# Patient Record
Sex: MALE | Race: WHITE | Employment: OTHER | URBAN - METROPOLITAN AREA
[De-identification: names, ages, dates, MRNs, and addresses within clinical notes are randomized per-mention and may not be internally consistent; named-entity substitution may affect disease eponyms.]

---

## 2017-10-17 ENCOUNTER — HOSPITAL ENCOUNTER (OUTPATIENT)
Dept: INTERVENTIONAL RADIOLOGY/VASCULAR | Age: 62
Discharge: HOME | End: 2017-10-17
Payer: MEDICARE

## 2017-10-17 VITALS
TEMPERATURE: 97.1 F | HEART RATE: 61 BPM | HEIGHT: 66 IN | WEIGHT: 135 LBS | RESPIRATION RATE: 18 BRPM | BODY MASS INDEX: 21.69 KG/M2 | DIASTOLIC BLOOD PRESSURE: 92 MMHG | OXYGEN SATURATION: 100 % | SYSTOLIC BLOOD PRESSURE: 189 MMHG

## 2017-10-17 DIAGNOSIS — N18.6 ESRD (END STAGE RENAL DISEASE) (HCC): ICD-10-CM

## 2017-10-17 LAB
INR BLD: 0.9
PARTIAL THROMBOPLASTIN TIME: 24.2 SEC (ref 21.3–31.3)
PLATELET # BLD: 199 K/UL (ref 140–450)
PROTHROMBIN TIME: 9.7 SEC (ref 9.4–12.6)

## 2017-10-17 PROCEDURE — 85610 PROTHROMBIN TIME: CPT

## 2017-10-17 PROCEDURE — 36596 MECH REMOV TUNNELED CV CATH: CPT | Performed by: RADIOLOGY

## 2017-10-17 PROCEDURE — 6360000002 HC RX W HCPCS: Performed by: RADIOLOGY

## 2017-10-17 PROCEDURE — 85049 AUTOMATED PLATELET COUNT: CPT

## 2017-10-17 PROCEDURE — 7100000010 HC PHASE II RECOVERY - FIRST 15 MIN

## 2017-10-17 PROCEDURE — 36581 REPLACE TUNNELED CV CATH: CPT | Performed by: RADIOLOGY

## 2017-10-17 PROCEDURE — 2580000003 HC RX 258: Performed by: RADIOLOGY

## 2017-10-17 PROCEDURE — 6360000004 HC RX CONTRAST MEDICATION: Performed by: INTERNAL MEDICINE

## 2017-10-17 PROCEDURE — 85730 THROMBOPLASTIN TIME PARTIAL: CPT

## 2017-10-17 PROCEDURE — 7100000011 HC PHASE II RECOVERY - ADDTL 15 MIN

## 2017-10-17 PROCEDURE — C1769 GUIDE WIRE: HCPCS

## 2017-10-17 PROCEDURE — 6360000002 HC RX W HCPCS

## 2017-10-17 PROCEDURE — 77001 FLUOROGUIDE FOR VEIN DEVICE: CPT | Performed by: RADIOLOGY

## 2017-10-17 PROCEDURE — 75902 REMOVE CVA LUMEN OBSTRUCT: CPT | Performed by: RADIOLOGY

## 2017-10-17 RX ORDER — ACETAMINOPHEN 325 MG/1
650 TABLET ORAL EVERY 4 HOURS PRN
Status: DISCONTINUED | OUTPATIENT
Start: 2017-10-17 | End: 2017-10-20 | Stop reason: HOSPADM

## 2017-10-17 RX ORDER — MIDAZOLAM HYDROCHLORIDE 1 MG/ML
INJECTION INTRAMUSCULAR; INTRAVENOUS
Status: COMPLETED | OUTPATIENT
Start: 2017-10-17 | End: 2017-10-17

## 2017-10-17 RX ORDER — HEPARIN SODIUM 5000 [USP'U]/ML
INJECTION, SOLUTION INTRAVENOUS; SUBCUTANEOUS
Status: COMPLETED | OUTPATIENT
Start: 2017-10-17 | End: 2017-10-17

## 2017-10-17 RX ORDER — SODIUM CHLORIDE 9 MG/ML
INJECTION, SOLUTION INTRAVENOUS CONTINUOUS
Status: DISCONTINUED | OUTPATIENT
Start: 2017-10-17 | End: 2017-10-20 | Stop reason: HOSPADM

## 2017-10-17 RX ORDER — FENTANYL CITRATE 50 UG/ML
INJECTION, SOLUTION INTRAMUSCULAR; INTRAVENOUS
Status: COMPLETED | OUTPATIENT
Start: 2017-10-17 | End: 2017-10-17

## 2017-10-17 RX ORDER — FAMOTIDINE 20 MG/1
20 TABLET, FILM COATED ORAL ONCE
COMMUNITY

## 2017-10-17 RX ADMIN — SODIUM CHLORIDE 20 ML: 9 INJECTION, SOLUTION INTRAVENOUS at 10:30

## 2017-10-17 RX ADMIN — HEPARIN SODIUM 1900 UNITS: 5000 INJECTION, SOLUTION INTRAVENOUS; SUBCUTANEOUS at 12:30

## 2017-10-17 RX ADMIN — CEFAZOLIN SODIUM 1 G: 1 INJECTION, SOLUTION INTRAVENOUS at 11:05

## 2017-10-17 RX ADMIN — IOVERSOL 5 ML: 741 INJECTION INTRA-ARTERIAL; INTRAVENOUS at 12:39

## 2017-10-17 RX ADMIN — FENTANYL CITRATE 50 MCG: 50 INJECTION INTRAMUSCULAR; INTRAVENOUS at 12:27

## 2017-10-17 RX ADMIN — MIDAZOLAM HYDROCHLORIDE 1 MG: 1 INJECTION, SOLUTION INTRAMUSCULAR; INTRAVENOUS at 12:27

## 2017-10-17 ASSESSMENT — PAIN - FUNCTIONAL ASSESSMENT: PAIN_FUNCTIONAL_ASSESSMENT: 0-10

## 2017-10-17 ASSESSMENT — PAIN SCALES - GENERAL: PAINLEVEL_OUTOF10: 0

## 2017-10-17 NOTE — H&P
History and Physical    Pt Name: Maliha Lu  MRN: [id number]  YOB: 1955  Date of evaluation: 10/17/2017  Primary Care Physician: Shira Garcia DO  Patient evaluated at the request of  Dr. Delena Leventhal    Reason for evaluation ESRD  SUBJECTIVE:   History of Chief Complaint:      Fadia Pelaez is a 65 y.o. male  Who has been on hemodialysis x 2 months , hx of left kidney cancer  Resulting in left nephrectomy . Is being worked up for / home Peritoneal dialysis . Past Medical History      has a past medical history of Accelerated rejection of lung transplant ([facility] [address] Acute blood loss anemia; Acute postoperative respiratory insufficiency; Bronchiolitis obliterans syndrome ([facility] [address] Cancer [facility]); COPD (chronic obstructive pulmonary disease) ([facility] [address] Diverticulitis; Hypoalbuminemia; Hypomagnesemia; Hypophosphatemia; Immunosuppression ([facility] [address] Intra-abdominal abscess ([facility] [address] Osteoporosis; Peritonitis ([facility] [address] and Vitamin D deficiency. Past Surgical History   has a past surgical history that includes Lung transplant, double; Leg Surgery; and Small intestine surgery. Medications   Scheduled Meds:   ceFAZolin  1 g Intravenous On Call to OR     Current Outpatient Rx   Medication Sig Dispense Refill    famotidine (PEPCID) 20 MG tablet Take 20 mg by mouth once      tacrolimus (PROGRAF) 1 MG capsule Take 1.5 mg by mouth 2 times daily.  aspirin 81 MG tablet Take 81 mg by mouth daily.  folic acid (FOLVITE) 1 MG tablet Take 1 mg by mouth daily.  sulfamethoxazole-trimethoprim (BACTRIM;SEPTRA) 400-80 MG per tablet Take 1 tablet by mouth 2 times daily. Once Wed and Friday      predniSONE (DELTASONE) 5 MG tablet Take 5 mg by mouth.  azithromycin (ZITHROMAX) 250 MG tablet Take 250 mg by mouth daily.  Cholecalciferol (VITAMIN D3) 65185 UNITS CAPS Take  by mouth.  Multiple Vitamins-Minerals (THERAPEUTIC MULTIVITAMIN-MINERALS) tablet Take 1 tablet by mouth.       montelukast (SINGULAIR) 10 MG tablet Take 10 mg by mouth nightly.  calcium carbonate (OSCAL) 500 MG TABS tablet Take 500 mg by mouth 2 times daily.  magnesium oxide (MAG-OX) 400 MG tablet Take 400 mg by mouth 2 times daily.  omeprazole (PRILOSEC) 20 MG capsule Take 20 mg by mouth daily. Continuous Infusions:   sodium chloride       PRN Meds:. Allergies  is allergic to albuterol and hydrocodone. Family History    family history is not on file. No family status information on file. Social History  Social History     Social History    Marital status:      Spouse name: N/A    Number of children: N/A    Years of education: N/A     Occupational History    Not on file. Social History Main Topics    Smoking status: Former Smoker     Packs/day: 1.50     Years: 30.00     Types: Cigarettes     Quit date: 2010    Smokeless tobacco: Not on file      Comment: quit  2010    Alcohol use No    Drug use: No    Sexual activity: Not on file     Other Topics Concern    Not on file     Social History Narrative    No narrative on file        Service:No         Hobbies:  Is a grandfather ,     OBJECTIVE:   VITALS:  height is 5' 6\" (1.676 m) and weight is 135 lb (61.2 kg). His infrared temperature is 97.1 °F (36.2 °C). His blood pressure is 211/95 (abnormal) and his pulse is 61. His respiration is 18 and oxygen saturation is 100%. CONSTITUTIONAL: Alert and oriented times 3, no acute distress and cooperative to examination. friendly and pleasant     SKIN: rash No    HEENT: Head is normocephalic, atraumatic. EOMI, PERRLA    Oral air way :slightly narrow Yes    NECK: neck supple, no lymphadenopathy noted, trachea midline and straight       2+ carotid, no bruit    LUNGS: Chest expands equally bilaterally upon respiration, no accessory muscle used. Ausculation reveals no adventitious breath sounds.   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

## 2018-10-15 ENCOUNTER — APPOINTMENT (OUTPATIENT)
Dept: GENERAL RADIOLOGY | Age: 63
End: 2018-10-15
Payer: MEDICARE

## 2018-10-15 ENCOUNTER — APPOINTMENT (OUTPATIENT)
Dept: CT IMAGING | Age: 63
End: 2018-10-15
Payer: MEDICARE

## 2018-10-15 ENCOUNTER — HOSPITAL ENCOUNTER (EMERGENCY)
Age: 63
Discharge: HOME | End: 2018-10-16
Attending: EMERGENCY MEDICINE
Payer: MEDICARE

## 2018-10-15 VITALS
OXYGEN SATURATION: 100 % | BODY MASS INDEX: 21.74 KG/M2 | WEIGHT: 130.5 LBS | HEART RATE: 94 BPM | DIASTOLIC BLOOD PRESSURE: 102 MMHG | TEMPERATURE: 97.7 F | RESPIRATION RATE: 14 BRPM | SYSTOLIC BLOOD PRESSURE: 196 MMHG | HEIGHT: 65 IN

## 2018-10-15 DIAGNOSIS — S42.292A OTHER CLOSED DISPLACED FRACTURE OF PROXIMAL END OF LEFT HUMERUS, INITIAL ENCOUNTER: Primary | ICD-10-CM

## 2018-10-15 DIAGNOSIS — S52.502A CLOSED FRACTURE OF DISTAL END OF LEFT RADIUS, UNSPECIFIED FRACTURE MORPHOLOGY, INITIAL ENCOUNTER: ICD-10-CM

## 2018-10-15 PROCEDURE — 96376 TX/PRO/DX INJ SAME DRUG ADON: CPT

## 2018-10-15 PROCEDURE — 96374 THER/PROPH/DIAG INJ IV PUSH: CPT

## 2018-10-15 PROCEDURE — 73080 X-RAY EXAM OF ELBOW: CPT

## 2018-10-15 PROCEDURE — 72128 CT CHEST SPINE W/O DYE: CPT

## 2018-10-15 PROCEDURE — 82306 VITAMIN D 25 HYDROXY: CPT

## 2018-10-15 PROCEDURE — 6360000002 HC RX W HCPCS: Performed by: EMERGENCY MEDICINE

## 2018-10-15 PROCEDURE — 73200 CT UPPER EXTREMITY W/O DYE: CPT

## 2018-10-15 PROCEDURE — 99284 EMERGENCY DEPT VISIT MOD MDM: CPT

## 2018-10-15 PROCEDURE — 72131 CT LUMBAR SPINE W/O DYE: CPT

## 2018-10-15 PROCEDURE — 72125 CT NECK SPINE W/O DYE: CPT

## 2018-10-15 PROCEDURE — 73060 X-RAY EXAM OF HUMERUS: CPT

## 2018-10-15 PROCEDURE — 73030 X-RAY EXAM OF SHOULDER: CPT

## 2018-10-15 PROCEDURE — 73110 X-RAY EXAM OF WRIST: CPT

## 2018-10-15 PROCEDURE — 70450 CT HEAD/BRAIN W/O DYE: CPT

## 2018-10-15 PROCEDURE — 73130 X-RAY EXAM OF HAND: CPT

## 2018-10-15 PROCEDURE — 29105 APPLICATION LONG ARM SPLINT: CPT

## 2018-10-15 RX ORDER — MORPHINE SULFATE 4 MG/ML
4 INJECTION, SOLUTION INTRAMUSCULAR; INTRAVENOUS ONCE
Status: COMPLETED | OUTPATIENT
Start: 2018-10-15 | End: 2018-10-15

## 2018-10-15 RX ADMIN — MORPHINE SULFATE 4 MG: 4 INJECTION INTRAVENOUS at 20:16

## 2018-10-15 RX ADMIN — MORPHINE SULFATE 4 MG: 4 INJECTION INTRAVENOUS at 22:03

## 2018-10-15 ASSESSMENT — PAIN DESCRIPTION - ORIENTATION: ORIENTATION: LEFT

## 2018-10-15 ASSESSMENT — PAIN DESCRIPTION - DESCRIPTORS: DESCRIPTORS: SPASM

## 2018-10-15 ASSESSMENT — PAIN SCALES - GENERAL
PAINLEVEL_OUTOF10: 9
PAINLEVEL_OUTOF10: 9

## 2018-10-15 ASSESSMENT — PAIN DESCRIPTION - PAIN TYPE: TYPE: ACUTE PAIN

## 2018-10-15 NOTE — ED NOTES
Pt to ED c/o left shoulder pain, left wrist pain and lower back pain. Pt states he fell from about 3 feet off a ladder. Pt states he was inside his garage putting insulation in the roof. Pt states he landed on the concrete floor on his left side. Pt arrives wearing a mask, as he had a double lung transplant in 2011 and likes to be careful. Pt states he does peritoneal dialysis at his home. Pt is alert and orientedx3, rr even and unlabored, will continue to monitor.       Karlos Dudley RN  10/15/18 0668

## 2018-10-15 NOTE — ED NOTES
Pt back from MRI. Pt given boxed lunch. No needs at this time. Will continue to monitor.      Roshni Bales RN  10/15/18 4897

## 2018-10-16 ENCOUNTER — APPOINTMENT (OUTPATIENT)
Dept: GENERAL RADIOLOGY | Age: 63
End: 2018-10-16
Payer: MEDICARE

## 2018-10-16 PROCEDURE — 6370000000 HC RX 637 (ALT 250 FOR IP): Performed by: EMERGENCY MEDICINE

## 2018-10-16 PROCEDURE — 73110 X-RAY EXAM OF WRIST: CPT

## 2018-10-16 RX ORDER — HYDROCODONE BITARTRATE AND ACETAMINOPHEN 5; 325 MG/1; MG/1
1 TABLET ORAL EVERY 6 HOURS PRN
Qty: 28 TABLET | Refills: 0 | Status: SHIPPED | OUTPATIENT
Start: 2018-10-16 | End: 2018-10-23

## 2018-10-16 RX ORDER — HYDROCODONE BITARTRATE AND ACETAMINOPHEN 5; 325 MG/1; MG/1
1 TABLET ORAL ONCE
Status: COMPLETED | OUTPATIENT
Start: 2018-10-16 | End: 2018-10-16

## 2018-10-16 RX ORDER — CYCLOBENZAPRINE HCL 10 MG
10 TABLET ORAL 3 TIMES DAILY PRN
Qty: 21 TABLET | Refills: 0 | Status: SHIPPED | OUTPATIENT
Start: 2018-10-16 | End: 2018-10-23

## 2018-10-16 RX ADMIN — HYDROCODONE BITARTRATE AND ACETAMINOPHEN 1 TABLET: 5; 325 TABLET ORAL at 01:08

## 2018-10-16 ASSESSMENT — ENCOUNTER SYMPTOMS
COUGH: 0
ABDOMINAL PAIN: 0
BACK PAIN: 1
EYE PAIN: 0
RHINORRHEA: 0
NAUSEA: 0
SHORTNESS OF BREATH: 0
COLOR CHANGE: 0
SORE THROAT: 0
VOMITING: 0

## 2018-10-16 ASSESSMENT — PAIN SCALES - GENERAL: PAINLEVEL_OUTOF10: 8

## 2018-10-16 NOTE — ED PROVIDER NOTES
[facility]  ED  Emergency Department Encounter  EmergencyMedicine Resident     Pt Name:Macon Beverley Felty  MRN: [id number]  [place] 1955  Date of evaluation: 10/16/18  PCP:  Vanessa Trejo, [address] [place] [address]       Chief Complaint   Patient presents with    Back Pain    Shoulder Injury     left shoulder landed on     Wrist Pain     left sided        HISTORY OF PRESENT ILLNESS  (Location/Symptom, Timing/Onset, Context/Setting, Quality, Duration, Modifying Factors, Severity.)      Marino Tobar is a 59 y.o. male who presents with Chief complaint of left shoulder pain, left wrist pain and lower back pain. States that about 1:00 this afternoon, he fell off a ladder from about 1 or 2 feet up from the ground. As he was getting off the ladder, he took a misstep and fell. States he landed on his left shoulder. States that he may have hit his left head, but denies any loss of consciousness. Does not take any blood thinners or anticoagulants. Denies any weakness, numbness or tingling to his upper or lower extremity spray only complaining of pain to the left shoulder and left wrist.  Does do peritoneal dialysis nightly at home. Denies any fever, chills, nausea, vomiting, headache or acute changes in vision. Denies any chest pain or belly pain. PAST MEDICAL / SURGICAL / SOCIAL / FAMILY HISTORY      has a past medical history of Accelerated rejection of lung transplant ([address] Acute blood loss anemia; Acute postoperative respiratory insufficiency; Bronchiolitis obliterans syndrome ([address] Cancer [facility]); COPD (chronic obstructive pulmonary disease) ([address] Diverticulitis; Hypoalbuminemia; Hypomagnesemia; Hypophosphatemia; Immunosuppression ([address] Intra-abdominal abscess ([address] Osteoporosis; Peritonitis ([address] and Vitamin D deficiency. has a past surgical history that includes Lung transplant, double; Leg Surgery; and Small intestine surgery.     Social History     Social History    Marital status:      Spouse name: N/A    Number of children: N/A    Years of education: N/A     Occupational History    Not on file. Social History Main Topics    Smoking status: Former Smoker     Packs/day: 1.50     Years: 30.00     Types: Cigarettes     Quit date: 2010    Smokeless tobacco: Never Used      Comment: quit  2010    Alcohol use No    Drug use: No    Sexual activity: Not on file     Other Topics Concern    Not on file     Social History Narrative    No narrative on file       History reviewed. No pertinent family history. Allergies:  Albuterol and Hydrocodone    Home Medications:  Prior to Admission medications    Medication Sig Start Date End Date Taking? Authorizing Provider   HYDROcodone-acetaminophen (NORCO) 5-325 MG per tablet Take 1 tablet by mouth every 6 hours as needed for Pain for up to 7 days. Intended supply: 3 days. Take lowest dose possible to manage pain. 10/16/18 10/23/18 Yes Teresa Albarran MD   cyclobenzaprine (FLEXERIL) 10 MG tablet Take 1 tablet by mouth 3 times daily as needed for Muscle spasms 10/16/18 10/23/18 Yes Teresa Albarran MD   tacrolimus (PROGRAF) 1 MG capsule Take 1.5 mg by mouth 2 times daily. Yes Historical Provider, MD   aspirin 81 MG tablet Take 81 mg by mouth daily. Yes Historical Provider, MD   folic acid (FOLVITE) 1 MG tablet Take 1 mg by mouth daily. Yes Historical Provider, MD   sulfamethoxazole-trimethoprim (BACTRIM;SEPTRA) 400-80 MG per tablet Take 1 tablet by mouth 2 times daily. Once Wed and Friday   Yes Historical Provider, MD   predniSONE (DELTASONE) 5 MG tablet Take 5 mg by mouth. Yes Historical Provider, MD   azithromycin (ZITHROMAX) 250 MG tablet Take 250 mg by mouth daily. Yes Historical Provider, MD   famotidine (PEPCID) 20 MG tablet Take 20 mg by mouth once    Historical Provider, MD   magnesium oxide (MAG-OX) 400 MG tablet Take 400 mg by mouth 2 times daily.     Historical Provider, MD   omeprazole (PRILOSEC) 20 MG capsule Views)    Result Date: 10/16/2018  EXAMINATION: 3 XRAY VIEWS OF THE LEFT WRIST 10/16/2018 12:39 am COMPARISON: Prior study at 9:05 p.m. HISTORY: ORDERING SYSTEM PROVIDED HISTORY: post-splint TECHNOLOGIST PROVIDED HISTORY: post-splint FINDINGS: Overlying cast material obscures skeletal detail. Previously described fracture through the distal radius shows near anatomic alignment. Evaluation of the triquetrum is limited due to cast material.     Near anatomic alignment of distal radius fracture. Xr Wrist Left (min 3 Views)    Result Date: 10/15/2018  EXAMINATION: FOUR XRAY VIEWS OF THE LEFT WRIST 10/15/2018 8:56 pm COMPARISON: None HISTORY: ORDERING SYSTEM PROVIDED HISTORY: fall TECHNOLOGIST PROVIDED HISTORY: fall FINDINGS: There is an acute closed traumatic nondisplaced fracture of the distal metaphysis of the left radius. Radial inclination and tilt are maintained. No associated ulnar fracture is identified. Small ossified fragment dorsal to the wrist may represent triquetrum fracture. Acute distal left radius fracture. Probable acute left triquetrum fracture. Xr Hand Left (min 3 Views)    Result Date: 10/15/2018  EXAMINATION: 3 XRAY VIEWS OF THE LEFT HAND 10/15/2018 8:56 pm COMPARISON: None HISTORY: ORDERING SYSTEM PROVIDED HISTORY: fall TECHNOLOGIST PROVIDED HISTORY: fall FINDINGS: No acute fracture or dislocation in the left hand. 3 mm radiopaque foreign body within the soft tissue of the 3rd digit along the palmar aspect of the distal phalanx. No other significant soft tissue findings. Radiopaque foreign body within the soft tissues of the distal 3rd digit. No underlying skeletal abnormality.      Ct Head Wo Contrast    Result Date: 10/15/2018  EXAMINATION: CT OF THE HEAD WITHOUT CONTRAST  10/15/2018 9:06 pm TECHNIQUE: CT of the head was performed without the administration of intravenous contrast. Dose modulation, iterative reconstruction, and/or weight based adjustment of the mA/kV was

## 2018-10-23 ENCOUNTER — OFFICE VISIT (OUTPATIENT)
Dept: ORTHOPEDIC SURGERY | Age: 63
End: 2018-10-23
Payer: MEDICARE

## 2018-10-23 VITALS — WEIGHT: 130.07 LBS | BODY MASS INDEX: 21.67 KG/M2 | HEIGHT: 65 IN

## 2018-10-23 DIAGNOSIS — S52.502A CLOSED FRACTURE OF DISTAL END OF LEFT RADIUS, UNSPECIFIED FRACTURE MORPHOLOGY, INITIAL ENCOUNTER: Primary | ICD-10-CM

## 2018-10-23 DIAGNOSIS — S42.295D OTHER CLOSED NONDISPLACED FRACTURE OF PROXIMAL END OF LEFT HUMERUS WITH ROUTINE HEALING, SUBSEQUENT ENCOUNTER: ICD-10-CM

## 2018-10-23 PROCEDURE — 23600 CLTX PROX HUMRL FX W/O MNPJ: CPT | Performed by: ORTHOPAEDIC SURGERY

## 2018-10-23 PROCEDURE — 25600 CLTX DST RDL FX/EPHYS SEP WO: CPT | Performed by: ORTHOPAEDIC SURGERY

## 2018-10-23 PROCEDURE — 99024 POSTOP FOLLOW-UP VISIT: CPT | Performed by: ORTHOPAEDIC SURGERY
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
## 2018-11-19 DIAGNOSIS — S42.295D OTHER CLOSED NONDISPLACED FRACTURE OF PROXIMAL END OF LEFT HUMERUS WITH ROUTINE HEALING, SUBSEQUENT ENCOUNTER: Primary | ICD-10-CM

## 2018-11-20 ENCOUNTER — OFFICE VISIT (OUTPATIENT)
Dept: ORTHOPEDIC SURGERY | Age: 63
End: 2018-11-20

## 2018-11-20 VITALS — WEIGHT: 130.07 LBS | HEIGHT: 65 IN | BODY MASS INDEX: 21.67 KG/M2

## 2018-11-20 DIAGNOSIS — S42.295D OTHER CLOSED NONDISPLACED FRACTURE OF PROXIMAL END OF LEFT HUMERUS WITH ROUTINE HEALING, SUBSEQUENT ENCOUNTER: Primary | ICD-10-CM

## 2018-11-20 DIAGNOSIS — S52.552D OTHER CLOSED EXTRA-ARTICULAR FRACTURE OF DISTAL END OF LEFT RADIUS WITH ROUTINE HEALING, SUBSEQUENT ENCOUNTER: ICD-10-CM

## 2018-11-20 PROCEDURE — 99024 POSTOP FOLLOW-UP VISIT: CPT | Performed by: ORTHOPAEDIC SURGERY

## 2018-12-20 ENCOUNTER — OFFICE VISIT (OUTPATIENT)
Dept: ORTHOPEDIC SURGERY | Age: 63
End: 2018-12-20

## 2018-12-20 VITALS — HEIGHT: 65 IN | WEIGHT: 130.07 LBS | BODY MASS INDEX: 21.67 KG/M2

## 2018-12-20 DIAGNOSIS — S42.295D OTHER CLOSED NONDISPLACED FRACTURE OF PROXIMAL END OF LEFT HUMERUS WITH ROUTINE HEALING, SUBSEQUENT ENCOUNTER: Primary | ICD-10-CM

## 2018-12-20 DIAGNOSIS — S52.552A OTHER CLOSED EXTRA-ARTICULAR FRACTURE OF DISTAL END OF LEFT RADIUS, INITIAL ENCOUNTER: ICD-10-CM

## 2018-12-20 PROBLEM — S52.502A CLOSED FRACTURE OF LEFT DISTAL RADIUS: Status: ACTIVE | Noted: 2018-12-20

## 2018-12-20 PROCEDURE — 99024 POSTOP FOLLOW-UP VISIT: CPT | Performed by: ORTHOPAEDIC SURGERY

## 2019-01-01 ENCOUNTER — HOSPITAL ENCOUNTER (INPATIENT)
Age: 64
LOS: 4 days | Discharge: ANOTHER HOSPITAL | DRG: 871 | End: 2019-09-27
Attending: EMERGENCY MEDICINE | Admitting: INTERNAL MEDICINE
Payer: MEDICARE

## 2019-01-01 ENCOUNTER — APPOINTMENT (OUTPATIENT)
Dept: GENERAL RADIOLOGY | Age: 64
DRG: 871 | End: 2019-01-01
Payer: MEDICARE

## 2019-01-01 ENCOUNTER — APPOINTMENT (OUTPATIENT)
Dept: CT IMAGING | Age: 64
DRG: 871 | End: 2019-01-01
Payer: MEDICARE

## 2019-01-01 VITALS
DIASTOLIC BLOOD PRESSURE: 78 MMHG | OXYGEN SATURATION: 99 % | BODY MASS INDEX: 18.99 KG/M2 | RESPIRATION RATE: 20 BRPM | HEIGHT: 66 IN | TEMPERATURE: 93.6 F | SYSTOLIC BLOOD PRESSURE: 108 MMHG | HEART RATE: 101 BPM | WEIGHT: 118.17 LBS

## 2019-01-01 DIAGNOSIS — R65.21 SEPTIC SHOCK (HCC): ICD-10-CM

## 2019-01-01 DIAGNOSIS — A41.9 SEPTIC SHOCK (HCC): ICD-10-CM

## 2019-01-01 DIAGNOSIS — J18.9 PNEUMONIA DUE TO ORGANISM: Primary | ICD-10-CM

## 2019-01-01 LAB
ABSOLUTE EOS #: 0 K/UL (ref 0–0.4)
ABSOLUTE EOS #: 0.09 K/UL (ref 0–0.4)
ABSOLUTE IMMATURE GRANULOCYTE: 0 K/UL (ref 0–0.3)
ABSOLUTE IMMATURE GRANULOCYTE: 0 K/UL (ref 0–0.3)
ABSOLUTE IMMATURE GRANULOCYTE: 0.1 K/UL (ref 0–0.3)
ABSOLUTE IMMATURE GRANULOCYTE: 0.14 K/UL (ref 0–0.3)
ABSOLUTE LYMPH #: 0.14 K/UL (ref 1–4.8)
ABSOLUTE LYMPH #: 0.29 K/UL (ref 1–4.8)
ABSOLUTE LYMPH #: 0.84 K/UL (ref 1–4.8)
ABSOLUTE LYMPH #: 1.27 K/UL (ref 1–4.8)
ABSOLUTE MONO #: 0.05 K/UL (ref 0.1–0.8)
ABSOLUTE MONO #: 0.07 K/UL (ref 0.1–0.8)
ABSOLUTE MONO #: 0.14 K/UL (ref 0.1–0.8)
ABSOLUTE MONO #: 0.27 K/UL (ref 0.1–0.8)
ALBUMIN SERPL-MCNC: 2 G/DL (ref 3.5–5.2)
ALBUMIN/GLOBULIN RATIO: 0.8 (ref 1–2.5)
ALLEN TEST: ABNORMAL
ALP BLD-CCNC: 74 U/L (ref 40–129)
ALT SERPL-CCNC: 22 U/L (ref 5–41)
ANION GAP SERPL CALCULATED.3IONS-SCNC: 18 MMOL/L (ref 9–17)
ANION GAP SERPL CALCULATED.3IONS-SCNC: 20 MMOL/L (ref 9–17)
ANION GAP SERPL CALCULATED.3IONS-SCNC: 22 MMOL/L (ref 9–17)
ANION GAP SERPL CALCULATED.3IONS-SCNC: 25 MMOL/L (ref 9–17)
ANION GAP: 11 MMOL/L (ref 7–16)
ASPERGILLUS GALACTO AG: NEGATIVE
ASPERGILLUS GALACTO INDEX: 0.03
AST SERPL-CCNC: 48 U/L
BASOPHILS # BLD: 0 % (ref 0–2)
BASOPHILS # BLD: 1 % (ref 0–2)
BASOPHILS ABSOLUTE: 0 K/UL (ref 0–0.2)
BASOPHILS ABSOLUTE: 0.09 K/UL (ref 0–0.2)
BILIRUB SERPL-MCNC: 0.41 MG/DL (ref 0.3–1.2)
BUN BLDV-MCNC: 64 MG/DL (ref 8–23)
BUN BLDV-MCNC: 66 MG/DL (ref 8–23)
BUN BLDV-MCNC: 69 MG/DL (ref 8–23)
BUN BLDV-MCNC: 72 MG/DL (ref 8–23)
BUN/CREAT BLD: ABNORMAL (ref 9–20)
CALCIUM IONIZED: 0.87 MMOL/L (ref 1.13–1.33)
CALCIUM IONIZED: 0.89 MMOL/L (ref 1.13–1.33)
CALCIUM SERPL-MCNC: 6.4 MG/DL (ref 8.6–10.4)
CALCIUM SERPL-MCNC: 6.7 MG/DL (ref 8.6–10.4)
CALCIUM SERPL-MCNC: 6.9 MG/DL (ref 8.6–10.4)
CALCIUM SERPL-MCNC: 7 MG/DL (ref 8.6–10.4)
CHLORIDE BLD-SCNC: 93 MMOL/L (ref 98–107)
CHLORIDE BLD-SCNC: 94 MMOL/L (ref 98–107)
CHLORIDE BLD-SCNC: 97 MMOL/L (ref 98–107)
CHLORIDE BLD-SCNC: 97 MMOL/L (ref 98–107)
CO2: 20 MMOL/L (ref 20–31)
CO2: 21 MMOL/L (ref 20–31)
CO2: 21 MMOL/L (ref 20–31)
CO2: 22 MMOL/L (ref 20–31)
CREAT SERPL-MCNC: 11.28 MG/DL (ref 0.7–1.2)
CREAT SERPL-MCNC: 13.47 MG/DL (ref 0.7–1.2)
CREAT SERPL-MCNC: 13.72 MG/DL (ref 0.7–1.2)
CREAT SERPL-MCNC: 9.44 MG/DL (ref 0.7–1.2)
CULTURE: NORMAL
CULTURE: NORMAL
DIFFERENTIAL TYPE: ABNORMAL
EKG ATRIAL RATE: 101 BPM
EKG ATRIAL RATE: 102 BPM
EKG ATRIAL RATE: 116 BPM
EKG ATRIAL RATE: 82 BPM
EKG P AXIS: 73 DEGREES
EKG P AXIS: 76 DEGREES
EKG P AXIS: 81 DEGREES
EKG P AXIS: 83 DEGREES
EKG P-R INTERVAL: 136 MS
EKG P-R INTERVAL: 144 MS
EKG P-R INTERVAL: 154 MS
EKG P-R INTERVAL: 158 MS
EKG Q-T INTERVAL: 350 MS
EKG Q-T INTERVAL: 368 MS
EKG Q-T INTERVAL: 384 MS
EKG Q-T INTERVAL: 430 MS
EKG QRS DURATION: 66 MS
EKG QRS DURATION: 68 MS
EKG QRS DURATION: 74 MS
EKG QRS DURATION: 76 MS
EKG QTC CALCULATION (BAZETT): 479 MS
EKG QTC CALCULATION (BAZETT): 486 MS
EKG QTC CALCULATION (BAZETT): 497 MS
EKG QTC CALCULATION (BAZETT): 502 MS
EKG R AXIS: 0 DEGREES
EKG R AXIS: 12 DEGREES
EKG R AXIS: 38 DEGREES
EKG R AXIS: 9 DEGREES
EKG T AXIS: 53 DEGREES
EKG T AXIS: 81 DEGREES
EKG T AXIS: 90 DEGREES
EKG T AXIS: 96 DEGREES
EKG VENTRICULAR RATE: 101 BPM
EKG VENTRICULAR RATE: 102 BPM
EKG VENTRICULAR RATE: 116 BPM
EKG VENTRICULAR RATE: 82 BPM
EOSINOPHILS RELATIVE PERCENT: 0 % (ref 1–4)
EOSINOPHILS RELATIVE PERCENT: 1 % (ref 1–4)
FIO2: ABNORMAL
GFR AFRICAN AMERICAN: 4 ML/MIN
GFR AFRICAN AMERICAN: 5 ML/MIN
GFR AFRICAN AMERICAN: 6 ML/MIN
GFR AFRICAN AMERICAN: 7 ML/MIN
GFR NON-AFRICAN AMERICAN: 4 ML/MIN
GFR NON-AFRICAN AMERICAN: 5 ML/MIN
GFR NON-AFRICAN AMERICAN: 6 ML/MIN
GFR SERPL CREATININE-BSD FRML MDRD: 5 ML/MIN
GFR SERPL CREATININE-BSD FRML MDRD: ABNORMAL ML/MIN/{1.73_M2}
GLUCOSE BLD-MCNC: 116 MG/DL (ref 70–99)
GLUCOSE BLD-MCNC: 159 MG/DL (ref 70–99)
GLUCOSE BLD-MCNC: 76 MG/DL (ref 74–100)
GLUCOSE BLD-MCNC: 78 MG/DL (ref 70–99)
GLUCOSE BLD-MCNC: 88 MG/DL (ref 70–99)
HCO3 VENOUS: 22.7 MMOL/L (ref 22–29)
HCT VFR BLD CALC: 28.5 % (ref 40.7–50.3)
HCT VFR BLD CALC: 31.5 % (ref 40.7–50.3)
HCT VFR BLD CALC: 32.4 % (ref 40.7–50.3)
HCT VFR BLD CALC: 33.9 % (ref 40.7–50.3)
HEMOGLOBIN: 10.5 G/DL (ref 13–17)
HEMOGLOBIN: 10.8 G/DL (ref 13–17)
HEMOGLOBIN: 11.1 G/DL (ref 13–17)
HEMOGLOBIN: 9.4 G/DL (ref 13–17)
IMMATURE GRANULOCYTES: 0 %
IMMATURE GRANULOCYTES: 0 %
IMMATURE GRANULOCYTES: 2 %
IMMATURE GRANULOCYTES: 2 %
INR BLD: 1.1
LACTIC ACID, SEPSIS WHOLE BLOOD: 2.4 MMOL/L (ref 0.5–1.9)
LACTIC ACID, SEPSIS WHOLE BLOOD: 2.9 MMOL/L (ref 0.5–1.9)
LACTIC ACID, SEPSIS WHOLE BLOOD: 3.3 MMOL/L (ref 0.5–1.9)
LACTIC ACID, SEPSIS WHOLE BLOOD: 4.8 MMOL/L (ref 0.5–1.9)
LACTIC ACID, SEPSIS: ABNORMAL MMOL/L (ref 0.5–1.9)
LACTIC ACID, WHOLE BLOOD: 2.4 MMOL/L (ref 0.7–2.1)
LV EF: 59 %
LVEF MODALITY: NORMAL
LYMPHOCYTES # BLD: 12 % (ref 24–44)
LYMPHOCYTES # BLD: 14 % (ref 24–44)
LYMPHOCYTES # BLD: 2 % (ref 24–44)
LYMPHOCYTES # BLD: 6 % (ref 24–44)
Lab: NORMAL
Lab: NORMAL
MAGNESIUM: 1.5 MG/DL (ref 1.6–2.6)
MCH RBC QN AUTO: 34.6 PG (ref 25.2–33.5)
MCH RBC QN AUTO: 35.2 PG (ref 25.2–33.5)
MCH RBC QN AUTO: 35.3 PG (ref 25.2–33.5)
MCH RBC QN AUTO: 35.4 PG (ref 25.2–33.5)
MCHC RBC AUTO-ENTMCNC: 32.4 G/DL (ref 28.4–34.8)
MCHC RBC AUTO-ENTMCNC: 32.7 G/DL (ref 28.4–34.8)
MCHC RBC AUTO-ENTMCNC: 33 G/DL (ref 28.4–34.8)
MCHC RBC AUTO-ENTMCNC: 34.3 G/DL (ref 28.4–34.8)
MCV RBC AUTO: 102.9 FL (ref 82.6–102.9)
MCV RBC AUTO: 105.6 FL (ref 82.6–102.9)
MCV RBC AUTO: 106.7 FL (ref 82.6–102.9)
MCV RBC AUTO: 109.1 FL (ref 82.6–102.9)
MODE: ABNORMAL
MONOCYTES # BLD: 1 % (ref 1–7)
MONOCYTES # BLD: 1 % (ref 1–7)
MONOCYTES # BLD: 2 % (ref 1–7)
MONOCYTES # BLD: 3 % (ref 1–7)
MORPHOLOGY: ABNORMAL
MRSA, DNA, NASAL: NORMAL
NEGATIVE BASE EXCESS, VEN: 1 (ref 0–2)
NRBC AUTOMATED: 0 PER 100 WBC
NUCLEATED RED BLOOD CELLS: 1 PER 100 WBC
O2 DEVICE/FLOW/%: ABNORMAL
O2 SAT, VEN: 70 % (ref 60–85)
PARTIAL THROMBOPLASTIN TIME: 22.2 SEC (ref 20.5–30.5)
PATIENT TEMP: ABNORMAL
PCO2, VEN: 32.2 MM HG (ref 41–51)
PDW BLD-RTO: 19.2 % (ref 11.8–14.4)
PDW BLD-RTO: 19.5 % (ref 11.8–14.4)
PDW BLD-RTO: 19.6 % (ref 11.8–14.4)
PDW BLD-RTO: 19.7 % (ref 11.8–14.4)
PH VENOUS: 7.46 (ref 7.32–7.43)
PLATELET # BLD: 78 K/UL (ref 138–453)
PLATELET # BLD: ABNORMAL K/UL (ref 138–453)
PLATELET ESTIMATE: ABNORMAL
PLATELET, FLUORESCENCE: 106 K/UL (ref 138–453)
PLATELET, FLUORESCENCE: 61 K/UL (ref 138–453)
PLATELET, FLUORESCENCE: 89 K/UL (ref 138–453)
PLATELET, IMMATURE FRACTION: 5.1 % (ref 1.1–10.3)
PLATELET, IMMATURE FRACTION: 6.3 % (ref 1.1–10.3)
PLATELET, IMMATURE FRACTION: ABNORMAL % (ref 1.1–10.3)
PMV BLD AUTO: 12.2 FL (ref 8.1–13.5)
PMV BLD AUTO: ABNORMAL FL (ref 8.1–13.5)
PO2, VEN: 34.5 MM HG (ref 30–50)
POC CHLORIDE: 101 MMOL/L (ref 98–107)
POC CREATININE: 12.91 MG/DL (ref 0.51–1.19)
POC HEMATOCRIT: 33 % (ref 41–53)
POC HEMOGLOBIN: 11.1 G/DL (ref 13.5–17.5)
POC IONIZED CALCIUM: 0.81 MMOL/L (ref 1.15–1.33)
POC LACTIC ACID: 5.85 MMOL/L (ref 0.56–1.39)
POC PCO2 TEMP: ABNORMAL MM HG
POC PH TEMP: ABNORMAL
POC PO2 TEMP: ABNORMAL MM HG
POC POTASSIUM: 3.9 MMOL/L (ref 3.5–4.5)
POC SODIUM: 135 MMOL/L (ref 138–146)
POSITIVE BASE EXCESS, VEN: ABNORMAL (ref 0–3)
POTASSIUM SERPL-SCNC: 3.3 MMOL/L (ref 3.7–5.3)
POTASSIUM SERPL-SCNC: 3.9 MMOL/L (ref 3.7–5.3)
POTASSIUM SERPL-SCNC: 4.3 MMOL/L (ref 3.7–5.3)
POTASSIUM SERPL-SCNC: 5 MMOL/L (ref 3.7–5.3)
PROCALCITONIN: 9.96 NG/ML
PROGRAF: 18.5 NG/ML (ref 5–20)
PROTHROMBIN TIME: 11.5 SEC (ref 9–12)
RBC # BLD: 2.67 M/UL (ref 4.21–5.77)
RBC # BLD: 2.97 M/UL (ref 4.21–5.77)
RBC # BLD: 3.06 M/UL (ref 4.21–5.77)
RBC # BLD: 3.21 M/UL (ref 4.21–5.77)
RBC # BLD: ABNORMAL 10*6/UL
SAMPLE SITE: ABNORMAL
SEG NEUTROPHILS: 81 % (ref 36–66)
SEG NEUTROPHILS: 84 % (ref 36–66)
SEG NEUTROPHILS: 91 % (ref 36–66)
SEG NEUTROPHILS: 97 % (ref 36–66)
SEGMENTED NEUTROPHILS ABSOLUTE COUNT: 4.36 K/UL (ref 1.8–7.7)
SEGMENTED NEUTROPHILS ABSOLUTE COUNT: 5.88 K/UL (ref 1.8–7.7)
SEGMENTED NEUTROPHILS ABSOLUTE COUNT: 6.89 K/UL (ref 1.8–7.7)
SEGMENTED NEUTROPHILS ABSOLUTE COUNT: 7.38 K/UL (ref 1.8–7.7)
SODIUM BLD-SCNC: 136 MMOL/L (ref 135–144)
SODIUM BLD-SCNC: 137 MMOL/L (ref 135–144)
SODIUM BLD-SCNC: 137 MMOL/L (ref 135–144)
SODIUM BLD-SCNC: 140 MMOL/L (ref 135–144)
SPECIMEN DESCRIPTION: NORMAL
TOTAL CO2, VENOUS: 24 MMOL/L (ref 23–30)
TOTAL PROTEIN: 4.6 G/DL (ref 6.4–8.3)
TROPONIN INTERP: ABNORMAL
TROPONIN T: ABNORMAL NG/ML
TROPONIN, HIGH SENSITIVITY: 140 NG/L (ref 0–22)
TROPONIN, HIGH SENSITIVITY: 143 NG/L (ref 0–22)
TROPONIN, HIGH SENSITIVITY: 152 NG/L (ref 0–22)
VANCOMYCIN RANDOM DATE LAST DOSE: NORMAL
VANCOMYCIN RANDOM DOSE AMOUNT: NORMAL
VANCOMYCIN RANDOM TIME LAST DOSE: NORMAL
VANCOMYCIN RANDOM: 8.2 UG/ML
WBC # BLD: 4.8 K/UL (ref 3.5–11.3)
WBC # BLD: 7 K/UL (ref 3.5–11.3)
WBC # BLD: 7.1 K/UL (ref 3.5–11.3)
WBC # BLD: 9.1 K/UL (ref 3.5–11.3)
WBC # BLD: ABNORMAL 10*3/UL

## 2019-01-01 PROCEDURE — 2580000003 HC RX 258: Performed by: INTERNAL MEDICINE

## 2019-01-01 PROCEDURE — 84295 ASSAY OF SERUM SODIUM: CPT

## 2019-01-01 PROCEDURE — 6360000002 HC RX W HCPCS: Performed by: INTERNAL MEDICINE

## 2019-01-01 PROCEDURE — 84132 ASSAY OF SERUM POTASSIUM: CPT

## 2019-01-01 PROCEDURE — 85730 THROMBOPLASTIN TIME PARTIAL: CPT

## 2019-01-01 PROCEDURE — 82947 ASSAY GLUCOSE BLOOD QUANT: CPT

## 2019-01-01 PROCEDURE — 80053 COMPREHEN METABOLIC PANEL: CPT

## 2019-01-01 PROCEDURE — 85055 RETICULATED PLATELET ASSAY: CPT

## 2019-01-01 PROCEDURE — 2500000003 HC RX 250 WO HCPCS: Performed by: STUDENT IN AN ORGANIZED HEALTH CARE EDUCATION/TRAINING PROGRAM

## 2019-01-01 PROCEDURE — 2700000000 HC OXYGEN THERAPY PER DAY

## 2019-01-01 PROCEDURE — 82330 ASSAY OF CALCIUM: CPT

## 2019-01-01 PROCEDURE — 84484 ASSAY OF TROPONIN QUANT: CPT

## 2019-01-01 PROCEDURE — 06HY33Z INSERTION OF INFUSION DEVICE INTO LOWER VEIN, PERCUTANEOUS APPROACH: ICD-10-PCS | Performed by: EMERGENCY MEDICINE

## 2019-01-01 PROCEDURE — 93005 ELECTROCARDIOGRAM TRACING: CPT | Performed by: STUDENT IN AN ORGANIZED HEALTH CARE EDUCATION/TRAINING PROGRAM

## 2019-01-01 PROCEDURE — 6370000000 HC RX 637 (ALT 250 FOR IP): Performed by: STUDENT IN AN ORGANIZED HEALTH CARE EDUCATION/TRAINING PROGRAM

## 2019-01-01 PROCEDURE — 80202 ASSAY OF VANCOMYCIN: CPT

## 2019-01-01 PROCEDURE — 83605 ASSAY OF LACTIC ACID: CPT

## 2019-01-01 PROCEDURE — 2580000003 HC RX 258: Performed by: STUDENT IN AN ORGANIZED HEALTH CARE EDUCATION/TRAINING PROGRAM

## 2019-01-01 PROCEDURE — 85025 COMPLETE CBC W/AUTO DIFF WBC: CPT

## 2019-01-01 PROCEDURE — 3E1M39Z IRRIGATION OF PERITONEAL CAVITY USING DIALYSATE, PERCUTANEOUS APPROACH: ICD-10-PCS | Performed by: INTERNAL MEDICINE

## 2019-01-01 PROCEDURE — 99222 1ST HOSP IP/OBS MODERATE 55: CPT | Performed by: INTERNAL MEDICINE

## 2019-01-01 PROCEDURE — 6360000002 HC RX W HCPCS: Performed by: STUDENT IN AN ORGANIZED HEALTH CARE EDUCATION/TRAINING PROGRAM

## 2019-01-01 PROCEDURE — 87040 BLOOD CULTURE FOR BACTERIA: CPT

## 2019-01-01 PROCEDURE — 36415 COLL VENOUS BLD VENIPUNCTURE: CPT

## 2019-01-01 PROCEDURE — 99233 SBSQ HOSP IP/OBS HIGH 50: CPT | Performed by: INTERNAL MEDICINE

## 2019-01-01 PROCEDURE — 84145 PROCALCITONIN (PCT): CPT

## 2019-01-01 PROCEDURE — 99232 SBSQ HOSP IP/OBS MODERATE 35: CPT | Performed by: INTERNAL MEDICINE

## 2019-01-01 PROCEDURE — 94761 N-INVAS EAR/PLS OXIMETRY MLT: CPT

## 2019-01-01 PROCEDURE — 96367 TX/PROPH/DG ADDL SEQ IV INF: CPT

## 2019-01-01 PROCEDURE — 87305 ASPERGILLUS AG IA: CPT

## 2019-01-01 PROCEDURE — 2500000003 HC RX 250 WO HCPCS

## 2019-01-01 PROCEDURE — 85610 PROTHROMBIN TIME: CPT

## 2019-01-01 PROCEDURE — 80048 BASIC METABOLIC PNL TOTAL CA: CPT

## 2019-01-01 PROCEDURE — 93010 ELECTROCARDIOGRAM REPORT: CPT | Performed by: INTERNAL MEDICINE

## 2019-01-01 PROCEDURE — 87641 MR-STAPH DNA AMP PROBE: CPT

## 2019-01-01 PROCEDURE — 82565 ASSAY OF CREATININE: CPT

## 2019-01-01 PROCEDURE — 83735 ASSAY OF MAGNESIUM: CPT

## 2019-01-01 PROCEDURE — 99291 CRITICAL CARE FIRST HOUR: CPT | Performed by: INTERNAL MEDICINE

## 2019-01-01 PROCEDURE — 93306 TTE W/DOPPLER COMPLETE: CPT

## 2019-01-01 PROCEDURE — 71045 X-RAY EXAM CHEST 1 VIEW: CPT

## 2019-01-01 PROCEDURE — 2000000000 HC ICU R&B

## 2019-01-01 PROCEDURE — 71250 CT THORAX DX C-: CPT

## 2019-01-01 PROCEDURE — 93005 ELECTROCARDIOGRAM TRACING: CPT | Performed by: INTERNAL MEDICINE

## 2019-01-01 PROCEDURE — 80197 ASSAY OF TACROLIMUS: CPT

## 2019-01-01 PROCEDURE — 96365 THER/PROPH/DIAG IV INF INIT: CPT

## 2019-01-01 PROCEDURE — 99285 EMERGENCY DEPT VISIT HI MDM: CPT

## 2019-01-01 PROCEDURE — 85014 HEMATOCRIT: CPT

## 2019-01-01 PROCEDURE — 82435 ASSAY OF BLOOD CHLORIDE: CPT

## 2019-01-01 PROCEDURE — 82803 BLOOD GASES ANY COMBINATION: CPT

## 2019-01-01 RX ORDER — ASPIRIN 81 MG/1
81 TABLET ORAL DAILY
Status: DISCONTINUED | OUTPATIENT
Start: 2019-01-01 | End: 2019-01-01 | Stop reason: HOSPADM

## 2019-01-01 RX ORDER — CHLORPROMAZINE HYDROCHLORIDE 25 MG/1
25 TABLET, FILM COATED ORAL 3 TIMES DAILY PRN
Status: DISCONTINUED | OUTPATIENT
Start: 2019-01-01 | End: 2019-01-01 | Stop reason: HOSPADM

## 2019-01-01 RX ORDER — ACETAMINOPHEN 325 MG/1
650 TABLET ORAL EVERY 4 HOURS PRN
Status: DISCONTINUED | OUTPATIENT
Start: 2019-01-01 | End: 2019-01-01 | Stop reason: HOSPADM

## 2019-01-01 RX ORDER — AZITHROMYCIN 250 MG/1
250 TABLET, FILM COATED ORAL
Status: DISCONTINUED | OUTPATIENT
Start: 2019-01-01 | End: 2019-01-01

## 2019-01-01 RX ORDER — SODIUM CHLORIDE, SODIUM LACTATE, CALCIUM CHLORIDE, MAGNESIUM CHLORIDE AND DEXTROSE 2.5; 538; 448; 18.3; 5.08 G/100ML; MG/100ML; MG/100ML; MG/100ML; MG/100ML
2500 INJECTION, SOLUTION INTRAPERITONEAL EVERY 6 HOURS
Status: DISCONTINUED | OUTPATIENT
Start: 2019-01-01 | End: 2019-01-01 | Stop reason: HOSPADM

## 2019-01-01 RX ORDER — LOPERAMIDE HYDROCHLORIDE 2 MG/1
2 CAPSULE ORAL 3 TIMES DAILY PRN
Status: DISCONTINUED | OUTPATIENT
Start: 2019-01-01 | End: 2019-01-01 | Stop reason: HOSPADM

## 2019-01-01 RX ORDER — LEVOFLOXACIN 5 MG/ML
500 INJECTION, SOLUTION INTRAVENOUS
Status: DISCONTINUED | OUTPATIENT
Start: 2019-01-01 | End: 2019-01-01 | Stop reason: HOSPADM

## 2019-01-01 RX ORDER — LEVOFLOXACIN 5 MG/ML
750 INJECTION, SOLUTION INTRAVENOUS EVERY 24 HOURS
Status: DISCONTINUED | OUTPATIENT
Start: 2019-01-01 | End: 2019-01-01

## 2019-01-01 RX ORDER — NOREPINEPHRINE BITARTRATE 1 MG/ML
INJECTION, SOLUTION INTRAVENOUS
Status: DISCONTINUED
Start: 2019-01-01 | End: 2019-01-01

## 2019-01-01 RX ORDER — SULFAMETHOXAZOLE AND TRIMETHOPRIM 400; 80 MG/1; MG/1
1 TABLET ORAL
Status: DISCONTINUED | OUTPATIENT
Start: 2019-01-01 | End: 2019-01-01 | Stop reason: HOSPADM

## 2019-01-01 RX ORDER — 0.9 % SODIUM CHLORIDE 0.9 %
1000 INTRAVENOUS SOLUTION INTRAVENOUS ONCE
Status: COMPLETED | OUTPATIENT
Start: 2019-01-01 | End: 2019-01-01

## 2019-01-01 RX ORDER — ASPIRIN 81 MG/1
324 TABLET, CHEWABLE ORAL ONCE
Status: COMPLETED | OUTPATIENT
Start: 2019-01-01 | End: 2019-01-01

## 2019-01-01 RX ORDER — LEVOFLOXACIN 5 MG/ML
750 INJECTION, SOLUTION INTRAVENOUS ONCE
Status: COMPLETED | OUTPATIENT
Start: 2019-01-01 | End: 2019-01-01

## 2019-01-01 RX ORDER — MAGNESIUM SULFATE 1 G/100ML
1 INJECTION INTRAVENOUS ONCE
Status: COMPLETED | OUTPATIENT
Start: 2019-01-01 | End: 2019-01-01

## 2019-01-01 RX ORDER — FENTANYL CITRATE 50 UG/ML
25 INJECTION, SOLUTION INTRAMUSCULAR; INTRAVENOUS
Status: DISCONTINUED | OUTPATIENT
Start: 2019-01-01 | End: 2019-01-01 | Stop reason: HOSPADM

## 2019-01-01 RX ORDER — CHLORPROMAZINE HYDROCHLORIDE 25 MG/1
25 TABLET, FILM COATED ORAL 3 TIMES DAILY
Status: DISCONTINUED | OUTPATIENT
Start: 2019-01-01 | End: 2019-01-01

## 2019-01-01 RX ORDER — PREDNISONE 1 MG/1
5 TABLET ORAL DAILY
Status: CANCELLED | OUTPATIENT
Start: 2019-01-01

## 2019-01-01 RX ORDER — SODIUM CHLORIDE 9 MG/ML
INJECTION, SOLUTION INTRAVENOUS CONTINUOUS
Status: DISCONTINUED | OUTPATIENT
Start: 2019-01-01 | End: 2019-01-01 | Stop reason: HOSPADM

## 2019-01-01 RX ORDER — HEPARIN SODIUM 5000 [USP'U]/ML
5000 INJECTION, SOLUTION INTRAVENOUS; SUBCUTANEOUS EVERY 8 HOURS SCHEDULED
Status: DISCONTINUED | OUTPATIENT
Start: 2019-01-01 | End: 2019-01-01 | Stop reason: HOSPADM

## 2019-01-01 RX ORDER — POTASSIUM CHLORIDE 29.8 MG/ML
40 INJECTION INTRAVENOUS ONCE
Status: COMPLETED | OUTPATIENT
Start: 2019-01-01 | End: 2019-01-01

## 2019-01-01 RX ORDER — SODIUM CHLORIDE 0.9 % (FLUSH) 0.9 %
10 SYRINGE (ML) INJECTION EVERY 12 HOURS SCHEDULED
Status: DISCONTINUED | OUTPATIENT
Start: 2019-01-01 | End: 2019-01-01 | Stop reason: HOSPADM

## 2019-01-01 RX ORDER — LEVOFLOXACIN 5 MG/ML
500 INJECTION, SOLUTION INTRAVENOUS ONCE
Status: COMPLETED | OUTPATIENT
Start: 2019-01-01 | End: 2019-01-01

## 2019-01-01 RX ORDER — SODIUM CHLORIDE 0.9 % (FLUSH) 0.9 %
10 SYRINGE (ML) INJECTION PRN
Status: DISCONTINUED | OUTPATIENT
Start: 2019-01-01 | End: 2019-01-01 | Stop reason: HOSPADM

## 2019-01-01 RX ORDER — CALCIUM GLUCONATE 94 MG/ML
2 INJECTION, SOLUTION INTRAVENOUS ONCE
Status: DISCONTINUED | OUTPATIENT
Start: 2019-01-01 | End: 2019-01-01 | Stop reason: SDUPTHER

## 2019-01-01 RX ORDER — ONDANSETRON 2 MG/ML
4 INJECTION INTRAMUSCULAR; INTRAVENOUS EVERY 6 HOURS PRN
Status: DISCONTINUED | OUTPATIENT
Start: 2019-01-01 | End: 2019-01-01 | Stop reason: HOSPADM

## 2019-01-01 RX ORDER — SODIUM CHLORIDE, SODIUM LACTATE, CALCIUM CHLORIDE, MAGNESIUM CHLORIDE AND DEXTROSE 1.5; 538; 448; 18.3; 5.08 G/100ML; MG/100ML; MG/100ML; MG/100ML; MG/100ML
2500 INJECTION, SOLUTION INTRAPERITONEAL EVERY 6 HOURS
Status: DISCONTINUED | OUTPATIENT
Start: 2019-01-01 | End: 2019-01-01

## 2019-01-01 RX ADMIN — SODIUM CHLORIDE, SODIUM LACTATE, CALCIUM CHLORIDE, MAGNESIUM CHLORIDE AND DEXTROSE 2500 ML: 2.5; 538; 448; 18.3; 5.08 INJECTION, SOLUTION INTRAPERITONEAL at 12:12

## 2019-01-01 RX ADMIN — FENTANYL CITRATE 25 MCG: 50 INJECTION INTRAMUSCULAR; INTRAVENOUS at 08:25

## 2019-01-01 RX ADMIN — SODIUM CHLORIDE, SODIUM LACTATE, CALCIUM CHLORIDE, MAGNESIUM CHLORIDE AND DEXTROSE 2500 ML: 1.5; 538; 448; 18.3; 5.08 INJECTION, SOLUTION INTRAPERITONEAL at 21:00

## 2019-01-01 RX ADMIN — FENTANYL CITRATE 25 MCG: 50 INJECTION INTRAMUSCULAR; INTRAVENOUS at 09:07

## 2019-01-01 RX ADMIN — FAMOTIDINE 20 MG: 10 INJECTION, SOLUTION INTRAVENOUS at 09:02

## 2019-01-01 RX ADMIN — HYDROCORTISONE SODIUM SUCCINATE 100 MG: 100 INJECTION, POWDER, FOR SOLUTION INTRAMUSCULAR; INTRAVENOUS at 15:30

## 2019-01-01 RX ADMIN — HYDROCORTISONE SODIUM SUCCINATE 100 MG: 100 INJECTION, POWDER, FOR SOLUTION INTRAMUSCULAR; INTRAVENOUS at 17:12

## 2019-01-01 RX ADMIN — ONDANSETRON 4 MG: 2 INJECTION INTRAMUSCULAR; INTRAVENOUS at 04:03

## 2019-01-01 RX ADMIN — SODIUM CHLORIDE, SODIUM LACTATE, CALCIUM CHLORIDE, MAGNESIUM CHLORIDE AND DEXTROSE 2500 ML: 1.5; 538; 448; 18.3; 5.08 INJECTION, SOLUTION INTRAPERITONEAL at 03:22

## 2019-01-01 RX ADMIN — HEPARIN SODIUM 5000 UNITS: 5000 INJECTION INTRAVENOUS; SUBCUTANEOUS at 14:09

## 2019-01-01 RX ADMIN — FAMOTIDINE 20 MG: 10 INJECTION, SOLUTION INTRAVENOUS at 08:21

## 2019-01-01 RX ADMIN — Medication 10 ML: at 08:00

## 2019-01-01 RX ADMIN — Medication 10 ML: at 21:48

## 2019-01-01 RX ADMIN — CHLORPROMAZINE HYDROCHLORIDE 25 MG: 25 TABLET, SUGAR COATED ORAL at 22:05

## 2019-01-01 RX ADMIN — FENTANYL CITRATE 25 MCG: 50 INJECTION INTRAMUSCULAR; INTRAVENOUS at 21:54

## 2019-01-01 RX ADMIN — Medication 10 ML: at 08:20

## 2019-01-01 RX ADMIN — Medication 81 MG: at 09:02

## 2019-01-01 RX ADMIN — SODIUM CHLORIDE 1000 ML: 9 INJECTION, SOLUTION INTRAVENOUS at 19:02

## 2019-01-01 RX ADMIN — SODIUM CHLORIDE, SODIUM LACTATE, CALCIUM CHLORIDE, MAGNESIUM CHLORIDE AND DEXTROSE 2500 ML: 1.5; 538; 448; 18.3; 5.08 INJECTION, SOLUTION INTRAPERITONEAL at 10:00

## 2019-01-01 RX ADMIN — HEPARIN SODIUM 5000 UNITS: 5000 INJECTION INTRAVENOUS; SUBCUTANEOUS at 22:04

## 2019-01-01 RX ADMIN — HEPARIN SODIUM 5000 UNITS: 5000 INJECTION INTRAVENOUS; SUBCUTANEOUS at 14:10

## 2019-01-01 RX ADMIN — HEPARIN SODIUM 5000 UNITS: 5000 INJECTION INTRAVENOUS; SUBCUTANEOUS at 22:18

## 2019-01-01 RX ADMIN — SODIUM CHLORIDE: 9 INJECTION, SOLUTION INTRAVENOUS at 02:30

## 2019-01-01 RX ADMIN — Medication 10 ML: at 09:11

## 2019-01-01 RX ADMIN — HEPARIN SODIUM 5000 UNITS: 5000 INJECTION INTRAVENOUS; SUBCUTANEOUS at 06:27

## 2019-01-01 RX ADMIN — SODIUM CHLORIDE, SODIUM LACTATE, CALCIUM CHLORIDE, MAGNESIUM CHLORIDE AND DEXTROSE 2500 ML: 2.5; 538; 448; 18.3; 5.08 INJECTION, SOLUTION INTRAPERITONEAL at 03:30

## 2019-01-01 RX ADMIN — CALCIUM GLUCONATE 2 G: 98 INJECTION, SOLUTION INTRAVENOUS at 07:50

## 2019-01-01 RX ADMIN — CHLORPROMAZINE HYDROCHLORIDE 25 MG: 25 TABLET, SUGAR COATED ORAL at 03:46

## 2019-01-01 RX ADMIN — SULFAMETHOXAZOLE AND TRIMETHOPRIM 1 TABLET: 400; 80 TABLET ORAL at 21:48

## 2019-01-01 RX ADMIN — HYDROCORTISONE SODIUM SUCCINATE 100 MG: 100 INJECTION, POWDER, FOR SOLUTION INTRAMUSCULAR; INTRAVENOUS at 22:04

## 2019-01-01 RX ADMIN — HYDROCORTISONE SODIUM SUCCINATE 100 MG: 100 INJECTION, POWDER, FOR SOLUTION INTRAMUSCULAR; INTRAVENOUS at 22:22

## 2019-01-01 RX ADMIN — NOREPINEPHRINE BITARTRATE 5 MCG/MIN: 1 INJECTION INTRAVENOUS at 20:49

## 2019-01-01 RX ADMIN — TACROLIMUS 1.5 MG: 1 CAPSULE ORAL at 02:24

## 2019-01-01 RX ADMIN — LEVOFLOXACIN 500 MG: 5 INJECTION, SOLUTION INTRAVENOUS at 01:39

## 2019-01-01 RX ADMIN — HYDROCORTISONE SODIUM SUCCINATE 100 MG: 100 INJECTION, POWDER, FOR SOLUTION INTRAMUSCULAR; INTRAVENOUS at 07:59

## 2019-01-01 RX ADMIN — HYDROCORTISONE SODIUM SUCCINATE 100 MG: 100 INJECTION, POWDER, FOR SOLUTION INTRAMUSCULAR; INTRAVENOUS at 21:48

## 2019-01-01 RX ADMIN — FENTANYL CITRATE 25 MCG: 50 INJECTION INTRAMUSCULAR; INTRAVENOUS at 22:15

## 2019-01-01 RX ADMIN — FENTANYL CITRATE 25 MCG: 50 INJECTION INTRAMUSCULAR; INTRAVENOUS at 23:55

## 2019-01-01 RX ADMIN — PIPERACILLIN AND TAZOBACTAM 3.38 G: 3; .375 INJECTION, POWDER, LYOPHILIZED, FOR SOLUTION INTRAVENOUS at 20:38

## 2019-01-01 RX ADMIN — TACROLIMUS 1.5 MG: 1 CAPSULE ORAL at 21:47

## 2019-01-01 RX ADMIN — HYDROCORTISONE SODIUM SUCCINATE 100 MG: 100 INJECTION, POWDER, FOR SOLUTION INTRAMUSCULAR; INTRAVENOUS at 09:02

## 2019-01-01 RX ADMIN — SODIUM CHLORIDE: 9 INJECTION, SOLUTION INTRAVENOUS at 01:44

## 2019-01-01 RX ADMIN — MEROPENEM 500 MG: 500 INJECTION, POWDER, FOR SOLUTION INTRAVENOUS at 11:02

## 2019-01-01 RX ADMIN — Medication 81 MG: at 08:20

## 2019-01-01 RX ADMIN — Medication 81 MG: at 09:43

## 2019-01-01 RX ADMIN — SULFAMETHOXAZOLE AND TRIMETHOPRIM 1 TABLET: 400; 80 TABLET ORAL at 07:59

## 2019-01-01 RX ADMIN — SODIUM CHLORIDE, SODIUM LACTATE, CALCIUM CHLORIDE, MAGNESIUM CHLORIDE AND DEXTROSE 2500 ML: 2.5; 538; 448; 18.3; 5.08 INJECTION, SOLUTION INTRAPERITONEAL at 21:40

## 2019-01-01 RX ADMIN — HEPARIN SODIUM 5000 UNITS: 5000 INJECTION INTRAVENOUS; SUBCUTANEOUS at 07:30

## 2019-01-01 RX ADMIN — ASPIRIN 81 MG 324 MG: 81 TABLET ORAL at 21:40

## 2019-01-01 RX ADMIN — SODIUM CHLORIDE: 9 INJECTION, SOLUTION INTRAVENOUS at 00:00

## 2019-01-01 RX ADMIN — HEPARIN SODIUM 5000 UNITS: 5000 INJECTION INTRAVENOUS; SUBCUTANEOUS at 14:18

## 2019-01-01 RX ADMIN — SODIUM CHLORIDE, SODIUM LACTATE, CALCIUM CHLORIDE, MAGNESIUM CHLORIDE AND DEXTROSE 2500 ML: 1.5; 538; 448; 18.3; 5.08 INJECTION, SOLUTION INTRAPERITONEAL at 09:26

## 2019-01-01 RX ADMIN — SODIUM CHLORIDE, SODIUM LACTATE, CALCIUM CHLORIDE, MAGNESIUM CHLORIDE AND DEXTROSE 2500 ML: 1.5; 538; 448; 18.3; 5.08 INJECTION, SOLUTION INTRAPERITONEAL at 15:31

## 2019-01-01 RX ADMIN — VANCOMYCIN HYDROCHLORIDE 500 MG: 500 INJECTION, POWDER, LYOPHILIZED, FOR SOLUTION INTRAVENOUS at 10:12

## 2019-01-01 RX ADMIN — LOPERAMIDE HYDROCHLORIDE 2 MG: 2 CAPSULE ORAL at 18:15

## 2019-01-01 RX ADMIN — TACROLIMUS 1.5 MG: 1 CAPSULE ORAL at 08:30

## 2019-01-01 RX ADMIN — CALCIUM GLUCONATE 2 G: 98 INJECTION, SOLUTION INTRAVENOUS at 12:02

## 2019-01-01 RX ADMIN — HEPARIN SODIUM 5000 UNITS: 5000 INJECTION INTRAVENOUS; SUBCUTANEOUS at 06:19

## 2019-01-01 RX ADMIN — TACROLIMUS 1.5 MG: 1 CAPSULE ORAL at 09:03

## 2019-01-01 RX ADMIN — HYDROCORTISONE SODIUM SUCCINATE 100 MG: 100 INJECTION, POWDER, FOR SOLUTION INTRAMUSCULAR; INTRAVENOUS at 06:11

## 2019-01-01 RX ADMIN — HYDROCORTISONE SODIUM SUCCINATE 100 MG: 100 INJECTION, POWDER, FOR SOLUTION INTRAMUSCULAR; INTRAVENOUS at 15:27

## 2019-01-01 RX ADMIN — SODIUM CHLORIDE, SODIUM LACTATE, CALCIUM CHLORIDE, MAGNESIUM CHLORIDE AND DEXTROSE 2500 ML: 2.5; 538; 448; 18.3; 5.08 INJECTION, SOLUTION INTRAPERITONEAL at 19:50

## 2019-01-01 RX ADMIN — MEROPENEM 500 MG: 500 INJECTION, POWDER, FOR SOLUTION INTRAVENOUS at 12:02

## 2019-01-01 RX ADMIN — FENTANYL CITRATE 25 MCG: 50 INJECTION INTRAMUSCULAR; INTRAVENOUS at 03:54

## 2019-01-01 RX ADMIN — FENTANYL CITRATE 25 MCG: 50 INJECTION INTRAMUSCULAR; INTRAVENOUS at 13:47

## 2019-01-01 RX ADMIN — POTASSIUM CHLORIDE 40 MEQ: 29.8 INJECTION, SOLUTION INTRAVENOUS at 09:45

## 2019-01-01 RX ADMIN — HYDROCORTISONE SODIUM SUCCINATE 100 MG: 100 INJECTION, POWDER, FOR SOLUTION INTRAMUSCULAR; INTRAVENOUS at 23:30

## 2019-01-01 RX ADMIN — AZITHROMYCIN 250 MG: 250 TABLET, FILM COATED ORAL at 07:59

## 2019-01-01 RX ADMIN — FENTANYL CITRATE 25 MCG: 50 INJECTION INTRAMUSCULAR; INTRAVENOUS at 20:04

## 2019-01-01 RX ADMIN — FAMOTIDINE 20 MG: 10 INJECTION, SOLUTION INTRAVENOUS at 07:59

## 2019-01-01 RX ADMIN — MAGNESIUM SULFATE HEPTAHYDRATE 1 G: 1 INJECTION, SOLUTION INTRAVENOUS at 06:36

## 2019-01-01 RX ADMIN — SODIUM CHLORIDE, SODIUM LACTATE, CALCIUM CHLORIDE, MAGNESIUM CHLORIDE AND DEXTROSE 2500 ML: 1.5; 538; 448; 18.3; 5.08 INJECTION, SOLUTION INTRAPERITONEAL at 09:04

## 2019-01-01 RX ADMIN — HEPARIN SODIUM 5000 UNITS: 5000 INJECTION INTRAVENOUS; SUBCUTANEOUS at 21:47

## 2019-01-01 RX ADMIN — CHLORPROMAZINE HYDROCHLORIDE 25 MG: 25 TABLET, SUGAR COATED ORAL at 14:09

## 2019-01-01 RX ADMIN — FENTANYL CITRATE 25 MCG: 50 INJECTION INTRAMUSCULAR; INTRAVENOUS at 06:52

## 2019-01-01 RX ADMIN — CHLORPROMAZINE HYDROCHLORIDE 25 MG: 25 TABLET, SUGAR COATED ORAL at 09:02

## 2019-01-01 RX ADMIN — TACROLIMUS 1.5 MG: 1 CAPSULE ORAL at 07:59

## 2019-01-01 RX ADMIN — TACROLIMUS 1.5 MG: 1 CAPSULE ORAL at 22:05

## 2019-01-01 RX ADMIN — VANCOMYCIN HYDROCHLORIDE 750 MG: 10 INJECTION, POWDER, LYOPHILIZED, FOR SOLUTION INTRAVENOUS at 22:22

## 2019-01-01 RX ADMIN — LEVOFLOXACIN 750 MG: 5 INJECTION, SOLUTION INTRAVENOUS at 09:44

## 2019-01-01 RX ADMIN — MEROPENEM 500 MG: 500 INJECTION, POWDER, FOR SOLUTION INTRAVENOUS at 11:32

## 2019-01-01 RX ADMIN — FENTANYL CITRATE 25 MCG: 50 INJECTION INTRAMUSCULAR; INTRAVENOUS at 17:37

## 2019-01-01 RX ADMIN — FENTANYL CITRATE 25 MCG: 50 INJECTION INTRAMUSCULAR; INTRAVENOUS at 11:33

## 2019-01-01 ASSESSMENT — PAIN - FUNCTIONAL ASSESSMENT
PAIN_FUNCTIONAL_ASSESSMENT: PREVENTS OR INTERFERES SOME ACTIVE ACTIVITIES AND ADLS
PAIN_FUNCTIONAL_ASSESSMENT: PREVENTS OR INTERFERES SOME ACTIVE ACTIVITIES AND ADLS

## 2019-01-01 ASSESSMENT — ENCOUNTER SYMPTOMS
COUGH: 0
RHINORRHEA: 0
APNEA: 0
PHOTOPHOBIA: 0
ABDOMINAL PAIN: 0
BACK PAIN: 0
ABDOMINAL PAIN: 0
NAUSEA: 0
COLOR CHANGE: 0
APNEA: 0
COLOR CHANGE: 0
APNEA: 0
EYE ITCHING: 0
SHORTNESS OF BREATH: 1
CHEST TIGHTNESS: 1
ABDOMINAL PAIN: 0
VOMITING: 0
COLOR CHANGE: 0
EYE ITCHING: 0
EYE ITCHING: 0

## 2019-01-01 ASSESSMENT — PAIN DESCRIPTION - LOCATION
LOCATION: CHEST
LOCATION: ABDOMEN;CHEST
LOCATION: CHEST
LOCATION: CHEST
LOCATION: CHEST;ABDOMEN
LOCATION: CHEST

## 2019-01-01 ASSESSMENT — PAIN SCALES - GENERAL
PAINLEVEL_OUTOF10: 6
PAINLEVEL_OUTOF10: 7
PAINLEVEL_OUTOF10: 0
PAINLEVEL_OUTOF10: 4
PAINLEVEL_OUTOF10: 5
PAINLEVEL_OUTOF10: 0
PAINLEVEL_OUTOF10: 8
PAINLEVEL_OUTOF10: 0
PAINLEVEL_OUTOF10: 8
PAINLEVEL_OUTOF10: 5
PAINLEVEL_OUTOF10: 3
PAINLEVEL_OUTOF10: 5
PAINLEVEL_OUTOF10: 2
PAINLEVEL_OUTOF10: 7
PAINLEVEL_OUTOF10: 8
PAINLEVEL_OUTOF10: 3
PAINLEVEL_OUTOF10: 0
PAINLEVEL_OUTOF10: 8
PAINLEVEL_OUTOF10: 8
PAINLEVEL_OUTOF10: 0

## 2019-01-01 ASSESSMENT — PAIN DESCRIPTION - PAIN TYPE
TYPE: ACUTE PAIN

## 2019-01-01 ASSESSMENT — PAIN DESCRIPTION - FREQUENCY
FREQUENCY: CONTINUOUS
FREQUENCY: CONTINUOUS

## 2019-01-01 ASSESSMENT — PAIN DESCRIPTION - ORIENTATION
ORIENTATION: RIGHT

## 2019-01-01 ASSESSMENT — PAIN DESCRIPTION - DESCRIPTORS
DESCRIPTORS: ACHING;CONSTANT
DESCRIPTORS: ACHING;CONSTANT

## 2019-01-01 ASSESSMENT — PAIN DESCRIPTION - PROGRESSION
CLINICAL_PROGRESSION: GRADUALLY IMPROVING

## 2019-01-01 ASSESSMENT — PAIN DESCRIPTION - ONSET
ONSET: ON-GOING
ONSET: ON-GOING

## 2019-09-23 NOTE — ED PROVIDER NOTES
had multiple episodes of deep coughing as well as dry heaving at the same time on and off throughout the night. There is no bright blood in his emesis. No sputum. No fevers. He had hiccups. He took a fraction of a nausea and pickup medicine and this helped. He has severe right-sided chest pain earlier today, coughing, pain is worse with breathing. No fevers. No nausea vomiting. He has peritoneal dialysis but no fever or cloudy exchange. He normally has low blood pressure. He has history of bilateral lung transplants over 10 years ago, he still compliant with his medications. No recent use of bronchodilators. No hemoptysis. No known heart disease. No leg pain calf swelling. He is on a new cancer medication for renal papillary cancer. EMS found patient to have saturations in the 70s with good waveform, high 80s on oxygen, tachycardic, blood pressure initially 80 systolic up to 95 systolic. On arrival here he is tachypneic, tachycardic, pain with speaking. There is significant right infra pectoral rib tenderness without crepitus. Breath sounds are symmetrical.  No JVD. No gallop. No edema cords Homans calf tenderness. Face is pink. Mouth is dry. Pulses are strong and DP/PT. Plan is IV access, laboratory studies including blood culture, chest x-ray, aerosols, BiPAP, IV fluids, analgesics. Diane Reinoso. Chuyita Hutchinson MD, [address] "[facility],[address]  Attending Emergency  Physician                Brian Newsome MD  09/23/19 1845    X-ray shows right-sided infiltrate. Bedside ultrasound shows sliding making thorax unlikely. Blood pressure 65/35, saturation 93% on oxygen without aerosols or BiPAP, heart rate remains elevated. Will give 30 cc/kilogram IV fluid bolus along with check labs, anticipate antibiotics, will consult with [facility]. Aspiration pneumonia most likely, pulmonary some less likely based on chest x-ray.        Brian Newsome MD  09/23/19 2947    Repeat EKG after improvement in heart rate shows persistent of ST depressions diffusely especially laterally. First troponin is elevated. Will start aspirin and consult cardiology. Antibiotics ordered for sepsis/pneumonia, [facility] madi. Jaz Scherer MD  09/23/19 2039    Sepsis Times and Checklist  Vital Signs: BP: 115/81  Pulse: 90  Resp: 26  Temp: 97.3 °F (36.3 °C) SpO2: 96 %  SIRS (>2)   Temp > 38.3C or < 36C   HR > 90   RR > 20   WBC > 12 or < 4 or >10% bands  SIRS (>2) and confirmed or suspected source of infection = Sepsis  Is Sepsis due to likely bacterial infection?: Yes      Sepsis Identified:  Date: 9/23/19   Time: 1845  Sepsis Orders:  ·  CBC: Yes  ·  CMP: Yes  ·  PT/PTT: Yes  ·  Blood Cultures x2: Yes  ·  Urinalysis and Urine Culture: Anuric  ·  Lactate: Yes  ·  Broad Spectrum Antibiotics Given (within 3 hours of sepsis identification,  after blood cultures):  Yes    (If unable to obtain IV access for IV antibiotics within 3 hours of  identification of sepsis, IM antibiotics is acceptable.)  ·             If lactate >2.0 MUST repeat within 6 hours    If elevated, is elevated lactate from a likely infectious source?: Yes. IV Fluid Bolus:  Is lactate > 4.0:  Yes  If lactate >  4.0 OR hypotension (MAP<65 mmHg) (with 2 BP readings) 30ml/kg crystalloid MUST be ordered. Fluids must be completed within 3 hours of sepsis identification. · Is the patient Morbidly Obese (BMI > 30): No  · IV Fluids given prior to arrival can be used in this calculation but the following information must be documented:  Start time: 1900, Type of fluid LR, Volume of fluid one L, and Rate (Duration or End time) . · Does the patient or patient advocate refuse the entire 30 ml/kg IV fluid bolus? Patient has renal failure. Concern for imminent fluid overload      Septic Shock Identified (Initial lactate > 4.0 or 2 Hypotensive Blood Pressure readings within the first 6 hours):      For septic shock sepsis focus

## 2019-09-24 NOTE — H&P
Critical Care - History and Physical Examination    Patient's name:  Dilshad Culver  Medical Record Number: [id number]  Patient's account/billing number: [de-identified]  Patient's YOB: 1955  Age: 62 y.o. Date of Admission: 9/23/2019  6:37 PM  Reason of ICU admission:   Date of History and Physical Examination: 9/23/2019      Primary Care Physician: No primary care provider on file. Attending Physician: Dr. Ute Walker    Code Status: Prior    Chief complaint:  Cough, SOB, hypoxia. Reason for ICU admission: Sepsis with hypotension. History Of Present Illness:   History was obtained from spouse. Dilshad Culver is a 62 y.o. male patient with past medical history of   - COPD s/p bilateral lung transplant 8 years back, stable, follows up in [facility] clinic, on Zithromax thrice weekly, Bactrim twice weekly and prednisone 5 mg daily. - Left nephrectomy in 2017 secondary to [address]  - ESRD on peritoneal dialysis    Patient was doing well until yesterday evening when he started having cough, shortness of breath, dry heaves and vomiting food particles, heartburn secondary to acid reflux and hiccups. Symptoms were acute in onset, preceded by dry cough since 2 days, no expectoration, no fever with chills. This was accompanied by right-sided chest pain, moderate intensity, nonradiating, no aggravating or relieving factors. He has not had similar complaints in the past.  He is a former smoker, quit 8 years back, no alcohol use, no drug use. EMS was called, was found to be tachypneic, oxygen saturation was in 70s, was placed on nonrebreather mask and oxygen saturation improved to 88%. He was also found to be hypotensive and was brought to the ED. in the ED, he was found to have low-grade fever, blood pressure 81/53, lactic acid elevated 5.85. He was given bolus of IV fluids at 30 cc/kg. VBG revealed pH of 7.456, CO2 32.2, PO2 84.5, bicarb 22.7.   Chest x-ray was done that was suggestive of right negative      Physical Exam:    Vitals: /70   Pulse 98   Temp 99.2 °F (37.3 °C)   Resp 20   Ht 5' 6\" (1.676 m)   Wt 120 lb (54.4 kg)   SpO2 99%   BMI 19.37 kg/m²     Body weight:   Wt Readings from Last 3 Encounters:   09/23/19 120 lb (54.4 kg)   12/20/18 130 lb 1.1 oz (59 kg)   11/20/18 130 lb 1.1 oz (59 kg)       Body Mass Index : Body mass index is 19.37 kg/m². PHYSICAL EXAMINATION :  Constitutional: Appears well, saturating well through nonrebreather mask   EENT: PERRLA, EOMI, sclera clear, anicteric, oropharynx clear, no lesions, neck supple with midline trachea. Neck: Supple, symmetrical, trachea midline, no adenopathy, thyroid symmetric, no jvd skin normal  Respiratory: clear to auscultation, no wheezes or rales and unlabored breathing. No intercostal tenderness  Cardiovascular: regular rate and rhythm, normal S1, S2, no murmur noted and 2+ pulses throughout  Abdomen: soft, nontender, nondistended, no masses or organomegaly  Neurological: Weak alert and oriented to time place and person  Extremities:  peripheral pulses normal, no pedal edema, no clubbing or cyanosis    Laboratory findings:-    CBC:   Recent Labs     09/23/19 1905   WBC 9.1   HGB 11.1*   PLT See Reflexed IPF Result     BMP:    Recent Labs     09/23/19 1857 09/23/19 1905   NA  --  140   K  --  4.3   CL  --  94*   CO2  --  21   BUN  --  69*   CREATININE 12.91* 13.47*   GLUCOSE  --  78     S.  Calcium:  Recent Labs     09/23/19 1905   CALCIUM 7.0*     S. Glucose:  Recent Labs     09/23/19 1857   POCGLU 76     INR:   Recent Labs     09/23/19 1905   INR 1.1     Hepatic functions:   Recent Labs     09/23/19 1905   ALKPHOS 74   ALT 22   AST 48*   PROT 4.6*   BILITOT 0.41   LABALBU 2.0*       Urinalysis: Not sent    Microbiology:  Cultures during this admission:     Blood cultures:                 [x]  drawn      [] Negative             []  Positive (Details:  )  Urine Culture:                   [x]  drawn      [] Negative []  Positive (Details:  )  Sputum Culture:               [x] None drawn       [] Negative             []  Positive (Details:  )   Endotracheal aspirate:     [x] None drawn       [] Negative             []  Positive (Details:  )         -----------------------------------------------------------------  Radiological reports:     CXR: Right upper lobe consolidation    Electrocardiogram: Nonspecific ST/T wave changes    Last Echocardiogram findings: None     Assessment and Plan     Patient Active Problem List   Diagnosis    Diverticulitis of intestine with perforation    [facility]    Osteoporosis    Vitamin D deficiency    Accelerated rejection of lung transplant [facility]    Bronchiolitis obliterans syndrome (HCC)    Edema    Sepsis ([facility]    Sepsis ([facility]    Peritonitis ([facility]    Acute postoperative respiratory insufficiency    [facility]osuppression ([facility]    Acute blood loss anemia    Intra-abdominal abscess (HCC)    Hypoalbuminemia    Hypophosphatemia    Hypomagnesemia    Closed fracture of left distal radius         Additional assessment: This is a 63 y/o male patient with past medical history of ESRD secondary to left renal cell carcinoma s/p nephrectomy on peritoneal dialysis and COPD s/p bilateral lung transplant in 2011 on Azithromycin, bactrim and prednisone who was admitted with low grade fever, hypotension, tachycardia, tachypnea with labs s/p lactic acidosis and imaging s/o right upper lobe pneumonia. Due to septic shock likely secondary to aspiration pneumonia vs penumonitis, he was started on Vancomycin and Zosyn and was given 30 ml/kg I.v. Fluids. He continued to be hypotensive, right femoral central line was placed and he was started on levophed. He was transferred to ICU for further evaluation and management. Plan:    1. Sepsis secondary to right upper lobe pneumonia  - Was given I.v.  Fluid bolus of 30 cc/kg in the ED.  - s/p right femoral central line placement, on levophed, continue levophed and titrate to MAP of 65 mm Hg.  - Continue to place on non rebreather mask and monitor oxygen saturation.  - Continue I.v. Fluids @ 50 cc/hr. - Continue vancomycin and Zosyn, ID on board. - Continue to monitor vitals. 2. Lactic acidosis   - LA 5.85 on admission, likely due to sepsis  - trending down after administering I.v. Fluids  - Continue I.v. Fluids 2 50 cc/hr, continue to trend lactic acid levels. 3. Troponinemia with non specific EKG changes  - Likely due to Type II MI from hypoxia.   - Continue aspirin 81 mg daily. - Cardiology on board. - No significant cardiac history. 4. ESRD with Cr. 13.4 on admission, h/o RCC s/p left nephrectomy  - s/p left nephrectomy secondary to [facility] in 2017.  - On peritoneal dialysis at home  - Continue tacrolimus 1 mg twice daily.  - Nephrology on board  - Continue to monitor BMP and electrolytes, replace PRN. 5. H/o bilateral lung transplant secondary to COPD in 2011  - stable, follows up with [facility], will coordinate for potential transfer. - ID on board for continuation of antibiotic regimen of Zithromax and Bactrim post transplant.  - Continue Hydrocortisone 100 mg q8 due to concern for adrenal insufficiency as the patient takes 5 mg prednisone daily at home. Pain management:   - C/o right sided chest pain, concern for rib fracture, will follow up chest CT.   - Continue fentanyl 25 mcg q2 PRN.     Prophylaxis:   DVT: lovenox 40 mg s.c. daily   GI: Pepcid 10 mg BID    Dispo:   Admit to ICU    Elizabeth Vanessa MD   Internal Medicine - PGY-1  Intensive Care Unit  9/23/2019, 9:56 PM

## 2019-09-24 NOTE — CARE COORDINATION
Monitor needs. Pt's wife verbalizes she will make pt's f/u PCP appt. After d/c. After assessment complete bedside nurse mentions she received a call from [facility] access line for pt update. No plan has been discussed with CM to transfer pt at this time. Pt and wife did not mention during interview. 1500 Spoke with Katie Menard at [facility] transfer line she informs the pt has been accepted to [facility], Dr. Houser Fret accepting physician  to admit to Surgical ICU, no bed available at this time. This transport request was initiated from [facility].           Electronically signed by Lance Loomis RN on 9/24/19 at 2:50 PM

## 2019-09-24 NOTE — PROGRESS NOTES
[]  Strong, productive cough []  Weak productive cough []  No cough or weak non-productive cough   MARCIE BERMUDEZ  9:03 AM                            FEMALE                                  MALE                            FEV1 Predicted Normal Values                        FEV1 Predicted Normal Values          Age                                     Height in Feet and Inches       Age                                     Height in Feet and Inches       4' 11\" 5' 1\" 5' 3\" 5' 5\" 5' 7\" 5' 9\" 5' 11\" 6' 1\"  4' 11\" 5' 1\" 5' 3\" 5' 5\" 5' 7\" 5' 9\" 5' 11\" 6' 1\"   42 - 45 2.49 2.66 2.84 3.03 3.22 3.42 3.62 3.83 42 - 45 2.82 3.03 3.26 3.49 3.72 3.96 4.22 4.47   46 - 49 2.40 2.57 2.76 2.94 3.14 3.33 3.54 3.75 46 - 49 2.70 2.92 3.14 3.37 3.61 3.85 4.10 4.36   50 - 53 2.31 2.48 2.66 2.85 3.04 3.24 3.45 3.66 50 - 53 2.58 2.80 3.02 3.25 3.49 3.73 3.98 4.24   54 - 57 2.21 2.38 2.57 2.75 2.95 3.14 3.35 3.56 54 - 57 2.46 2.67 2.89 3.12 3.36 3.60 3.85 4.11   58 - 61 2.10 2.28 2.46 2.65 2.84 3.04 3.24 3.45 58 - 61 2.32 2.54 2.76 2.99 3.23 3.47 3.72 3.98   62 - 65 1.99 2.17 2.35 2.54 2.73 2.93 3.13 3.34 62 - 65 2.19 2.40 2.62 2.85 3.09 3.33 3.58 3.84   66 - 69 1.88 2.05 2.23 2.42 2.61 2.81 3.02 3.23 66 - 69 2.04 2.26 2.48 2.71 2.95 3.19 3.44 3.70   70+ 1.82 1.99 2.17 2.36 2.55 2.75 2.95 3.16 70+ 1.97 2.19 2.41 2.64 2.87 3.12 3.37 3.62             Predicted Peak Expiratory Flow Rate                                       Height (in)  Female       Height (in) Male           Age 58 63 61 63 56 77 78 74 Age            20 625 678 [phone]  40 01 99 52 68 70 72 74 76   25 337 352 366 381 396 411 426 441 25 447 476 [phone] [phone]   30 329 344 359 374 389 404 419 434 30 437 466 494 523 552 580 [phone]   35 322 337 351 366 381 396 411 426 35 426 455 484 512 541 [phone]   40 314 329 344 359 374 389 404 419 40 416 445 473 502 531 559 [phone]   45 307 322 336 351 366 381 396 411 45 405 434 463 [phone]

## 2019-09-24 NOTE — PROGRESS NOTES
Hypophosphatemia     Immunosuppression (HCC)     [facility]     Osteoporosis     Peritonitis ([facility]     Vitamin D deficiency        Past Surgical History:        Procedure Laterality Date    LEG SURGERY      LUNG TRANSPLANT, DOUBLE      SMALL INTESTINE SURGERY         Current Medications:      aspirin EC tablet 81 mg Daily   [START ON 9/25/2019] azithromycin (ZITHROMAX) tablet 250 mg Once per day on Mon Wed Fri   [START ON 9/25/2019] sulfamethoxazole-trimethoprim (BACTRIM;SEPTRA) 400-80 MG per tablet 1 tablet 2 times per day on Wed Fri   tacrolimus (proGRAF) capsule 1.5 mg BID   sodium chloride flush 0.9 % injection 10 mL 2 times per day   sodium chloride flush 0.9 % injection 10 mL PRN   ondansetron (ZOFRAN) injection 4 mg Q6H PRN   famotidine (PEPCID) injection 20 mg Daily   acetaminophen (TYLENOL) tablet 650 mg Q4H PRN   heparin (porcine) injection 5,000 Units 3 times per day   chlorproMAZINE (THORAZINE) tablet 25 mg TID   0.9 % sodium chloride infusion Continuous   dianeal lo-emily 1.5% 2,500 mL Q6H   fentaNYL (SUBLIMAZE) injection 25 mcg Q2H PRN   meropenem (MERREM) 500 mg IVPB minibag Q24H   norepinephrine (LEVOPHED) 16 mg in dextrose 5 % 250 mL infusion Continuous   hydrocortisone sodium succinate PF (SOLU-CORTEF) injection 100 mg Q8H       Allergies:  Albuterol and Hydrocodone    Social History:   Social History     Socioeconomic History    Marital status:      Spouse name: Not on file    Number of children: Not on file    Years of education: Not on file    Highest education level: Not on file   Occupational History    Not on file   Social Needs    Financial resource strain: Not on file    Food insecurity:     Worry: Not on file     Inability: Not on file    Transportation needs:     Medical: Not on file     Non-medical: Not on file   Tobacco Use    Smoking status: Former Smoker     Packs/day: 1.50     Years: 30.00     Pack years: 45.00     Types: Cigarettes     Last

## 2019-09-24 NOTE — PLAN OF CARE
Problem: Risk for Impaired Skin Integrity  Goal: Tissue integrity - skin and mucous membranes  Description  Structural intactness and normal physiological function of skin and  mucous membranes.   Outcome: Ongoing     Problem: Falls - Risk of:  Goal: Will remain free from falls  Description  Will remain free from falls  Outcome: Met This Shift  Goal: Absence of physical injury  Description  Absence of physical injury  Outcome: Met This Shift

## 2019-09-24 NOTE — PROGRESS NOTES
[] pending)    VASOPRESSORS:  [x] No    [] Yes    If yes -   [] Levophed       [] Dopamine     [] Vasopressin       [] Dobutamine  [] Phenylephrine         [] Epinephrine    CENTRAL LINES:     [] No   [] Yes   (Date of Insertion:   )           If yes -     [] Right IJ     [] Left IJ [x] Right Femoral [] Left Femoral                   [] Right Subclavian [] Left Subclavian       MOORE'S CATHETER:   [x] No   [] Yes  (Date of Insertion:   )     URINE OUTPUT:            [] Good   [x] Low              [] Anuric      OBJECTIVE:     VITAL SIGNS:  /69   Pulse 85   Temp 97.3 °F (36.3 °C) (Oral)   Resp 21   Ht 5' 6\" (1.676 m)   Wt 118 lb 2.7 oz (53.6 kg)   SpO2 97%   BMI 19.07 kg/m²   Tmax over 24 hours:  Temp (24hrs), Av.3 °F (36.8 °C), Min:97.3 °F (36.3 °C), Max:99.2 °F (37.3 °C)      Patient Vitals for the past 8 hrs:   BP Temp Temp src Pulse Resp SpO2 Height Weight   19 0600 109/69 -- -- 85 21 97 % -- --   19 0500 108/78 -- -- 86 19 97 % -- --   19 0400 115/81 97.3 °F (36.3 °C) Oral 90 26 96 % -- --   19 0300 103/72 -- -- 82 19 92 % -- --   19 0200 87/60 -- -- 88 16 98 % -- --   19 0100 131/72 98.4 °F (36.9 °C) Oral 89 20 94 % 5' 6\" (1.676 m) 118 lb 2.7 oz (53.6 kg)         Intake/Output Summary (Last 24 hours) at 2019 0821  Last data filed at 2019 0400  Gross per 24 hour   Intake 866 ml   Output --   Net 866 ml     Date 19 0000 - 19 2359   Shift 5027-7804 5147-2421 0259-8531 24 Hour Total   INTAKE   I.V.(mL/kg) 741(80.8)   126(54.8)   Shift Total(mL/kg) 266(82.4)   834(15.0)   OUTPUT   Shift Total(mL/kg)       Weight (kg) 53.6 53.6 53.6 53.6     Wt Readings from Last 3 Encounters:   19 118 lb 2.7 oz (53.6 kg)   18 130 lb 1.1 oz (59 kg)   18 130 lb 1.1 oz (59 kg)     Body mass index is 19.07 kg/m².         PHYSICAL EXAM:  Constitutional: Appears well, saturating well through nonrebreather mask   EENT: ALVIN

## 2019-09-24 NOTE — FLOWSHEET NOTE
SPIRITUAL CARE DEPARTMENT - [address]  PROGRESS NOTE    Shift date: 9/23/19  Shift day: Monday   Shift # 2    Room # 25/25   Name: Nirmala Keith            Age: 62 y.o. Gender: male          [employer]: [employer]  Place of Denominational: Unknown    Referral: Routine Visit - Actually it was emotional distress    Admit Date & Time: 9/23/2019  6:37 PM    PATIENT/EVENT DESCRIPTION:  Nirmala Keith is a 62 y.o. male who arrived in ED by EMS with respiratory distress. Patient had previously had both of his lungs replaced and was experiencing shortness of breath and is also battling other physical issues. SPIRITUAL ASSESSMENT/INTERVENTION:   received phone message from ED [place] that family needed emotional support. Initially, the medical team was inserting a central line, so  had to wait. Later he found patient's wife, Vicky Majano ([phone]) in the small waiting area, so knelt down beside her to offer spiritual and emotional support. Patient is anxious, but coping.  put his arm around her and offered prayer. She was happy that  had come and welcomed prayer.  then went into patient's room, where two of his daughters were present and talked briefly with patient himself and offered prayer again and encouraged patient to not give up. SPIRITUAL CARE FOLLOW-UP PLAN:  Chaplains will remain available to offer spiritual and emotional support as needed. Electronically signed by Pao Darnell      09/23/19 2045   Encounter Summary   Services provided to: Patient and family together   Referral/Consult From: Nursing Supervisor/Manager   Support System Spouse; Children;Family members   Place of Confucianism   José Luis Gaines)   Continue Visiting   (9/23/19)   Complexity of Encounter Moderate   Length of Encounter 30 minutes   Spiritual Assessment Completed Yes   Crisis   Type Emotional distress   Assessment Approachable; Anxious; Coping   Intervention Active listening;Explored feelings,

## 2019-09-24 NOTE — ED PROVIDER NOTES
[facility] SICU  Emergency Department Encounter  EmergencyMedicine Resident     Pt Name:[place] Omkar Martinez  MRN: [id number]  [place] 1955  Date of evaluation: 9/23/19  PCP:  No primary care provider on file. CHIEF COMPLAINT          Chief Complaint   Patient presents with    Respiratory Distress     88% PTA on NRB       HISTORY OF PRESENT ILLNESS  (Location/Symptom, Timing/Onset, Context/Setting, Quality, Duration, Modifying Factors, Severity.)       Agata Parish is a 62 y.o. male who presents with acute respiratory distress. He was dry heaving at home and complaining of right-sided chest pain. He told his wife he is having difficulty breathing and he noticed that he was working hard, thus called the ambulance. Patient was found to be satting in the 70s on room air, only improved to mid 80s on a nonrebreather. Patient has a [facility] is on immunosuppressive therapy, but does not require any breathing treatments, is not on any home oxygen. He is no longer a smoker since his transplant. He also has a history of kidney failure, and receives daily peritoneal dialysis. He has had fluctuating blood pressures per wife over the past several months due to changing his dialysis fluids. Patient was having chills at home as well. He has not had any complications from his transplant. No sick contacts no fevers at home. No history of blood clots. Has been compliant with his medications.     PAST MEDICAL / SURGICAL / SOCIAL / FAMILY HISTORY       has a past medical history of Accelerated rejection of lung transplant ([address] Acute blood loss anemia, Acute postoperative respiratory insufficiency, Bronchiolitis obliterans syndrome ([address] Cancer ([address] COPD (chronic obstructive pulmonary disease) ([address] Diverticulitis, Hypoalbuminemia, Hypomagnesemia, Hypophosphatemia, Immunosuppression ([address] Intra-abdominal abscess ([address] Osteoporosis, Peritonitis ([address]

## 2019-09-24 NOTE — CONSULTS
predniSONE (DELTASONE) 5 MG tablet Take 5 mg by mouth. Historical Provider, MD   azithromycin (ZITHROMAX) 250 MG tablet Take 250 mg by mouth daily. Historical Provider, MD   omeprazole (PRILOSEC) 20 MG capsule Take 20 mg by mouth daily. Historical Provider, MD   Cholecalciferol (VITAMIN D3) 51151 UNITS CAPS Take  by mouth. Historical Provider, MD   Multiple Vitamins-Minerals (THERAPEUTIC MULTIVITAMIN-MINERALS) tablet Take 1 tablet by mouth. Historical Provider, MD   montelukast (SINGULAIR) 10 MG tablet Take 10 mg by mouth nightly. Historical Provider, MD   calcium carbonate (OSCAL) 500 MG TABS tablet Take 500 mg by mouth 2 times daily.     Historical Provider, MD      Current Facility-Administered Medications: aspirin EC tablet 81 mg, 81 mg, Oral, Daily  [START ON 9/25/2019] azithromycin (ZITHROMAX) tablet 250 mg, 250 mg, Oral, Once per day on Mon Wed Fri  [START ON 9/25/2019] sulfamethoxazole-trimethoprim (BACTRIM;SEPTRA) 400-80 MG per tablet 1 tablet, 1 tablet, Oral, 2 times per day on Wed Fri  tacrolimus (proGRAF) capsule 1.5 mg, 1.5 mg, Oral, BID  sodium chloride flush 0.9 % injection 10 mL, 10 mL, Intravenous, 2 times per day  sodium chloride flush 0.9 % injection 10 mL, 10 mL, Intravenous, PRN  ondansetron (ZOFRAN) injection 4 mg, 4 mg, Intravenous, Q6H PRN  famotidine (PEPCID) injection 20 mg, 20 mg, Intravenous, Daily  acetaminophen (TYLENOL) tablet 650 mg, 650 mg, Oral, Q4H PRN  heparin (porcine) injection 5,000 Units, 5,000 Units, Subcutaneous, 3 times per day  chlorproMAZINE (THORAZINE) tablet 25 mg, 25 mg, Oral, TID  0.9 % sodium chloride infusion, , Intravenous, Continuous  dianeal lo-emily 1.5% 2,500 mL, 2,500 mL, Intraperitoneal, Q6H  fentaNYL (SUBLIMAZE) injection 25 mcg, 25 mcg, Intravenous, Q2H PRN  norepinephrine (LEVOPHED) 16 mg in dextrose 5 % 250 mL infusion, 2 mcg/min, Intravenous, Continuous  hydrocortisone sodium succinate PF (SOLU-CORTEF) injection 100 mg, 100 mg, Intravenous, Q8H    Allergies:  Albuterol and Hydrocodone    Social History:   reports that he quit smoking about 9 years ago. His smoking use included cigarettes. He has a 45.00 pack-year smoking history. He has never used smokeless tobacco. He reports that he does not drink alcohol or use drugs. Family History: family history is not on file. No h/o sudden cardiac death. No for premature CAD    REVIEW OF SYSTEMS:    · Constitutional: there has been no unanticipated weight loss. There's been No change in energy level, No change in activity level. · Eyes: No visual changes or diplopia. No scleral icterus. · ENT: No Headaches  · Cardiovascular: No cardiac history  · Respiratory cough, shortness of breath, dry heaves  · Gastrointestinal: No abdominal pain, positive for vomiting  · Genitourinary: No dysuria, trouble voiding, or hematuria. · Musculoskeletal:  No gait disturbance, No weakness or joint complaints. · Integumentary: No rash or pruritis. · Neurological: No headache, diplopia, change in muscle strength, numbness or tingling. No change in gait, balance, coordination, mood, affect, memory, mentation, behavior. · Psychiatric: No anxiety, or depression. · Endocrine: No temperature intolerance. No excessive thirst, fluid intake, or urination. No tremor. · Hematologic/Lymphatic: No abnormal bruising or bleeding, blood clots or swollen lymph nodes. · Allergic/Immunologic: No nasal congestion or hives. PHYSICAL EXAM:      /69   Pulse 85   Temp 97.3 °F (36.3 °C) (Oral)   Resp 21   Ht 5' 6\" (1.676 m)   Wt 118 lb 2.7 oz (53.6 kg)   SpO2 97%   BMI 19.07 kg/m²    Constitutional and General Appearance: alert, cooperative, no distress and appears stated age  HEENT: PERRL, no cervical lymphadenopathy. No masses palpable. Normal oral mucosa  Respiratory:  · Normal excursion and expansion without use of accessory muscles  · Resp Auscultation: Good respiratory effort.  No for increased work of

## 2019-09-25 PROBLEM — J96.01 ACUTE RESPIRATORY FAILURE WITH HYPOXIA (HCC): Status: ACTIVE | Noted: 2019-01-01

## 2019-09-25 NOTE — PROGRESS NOTES
sodium chloride flush 0.9 % injection 10 mL 2 times per day   sodium chloride flush 0.9 % injection 10 mL PRN   ondansetron (ZOFRAN) injection 4 mg Q6H PRN   famotidine (PEPCID) injection 20 mg Daily   acetaminophen (TYLENOL) tablet 650 mg Q4H PRN   heparin (porcine) injection 5,000 Units 3 times per day   0.9 % sodium chloride infusion Continuous   dianeal lo-emily 1.5% 2,500 mL Q6H   fentaNYL (SUBLIMAZE) injection 25 mcg Q2H PRN   meropenem (MERREM) 500 mg IVPB minibag Q24H   vancomycin (VANCOCIN) intermittent dosing (placeholder) RX Placeholder   loperamide (IMODIUM) capsule 2 mg TID PRN   norepinephrine (LEVOPHED) 16 mg in dextrose 5 % 250 mL infusion Continuous   hydrocortisone sodium succinate PF (SOLU-CORTEF) injection 100 mg Q8H         LABS      CBC: Recent Labs     09/23/19 1905 09/24/19 0353 09/25/19 0411   WBC 9.1 7.0 4.8   RBC 3.21* 2.97* 2.67*   HGB 11.1* 10.5* 9.4*   HCT 33.9* 32.4* 28.5*   .6* 109.1* 106.7*   MCH 34.6* 35.4* 35.2*   MCHC 32.7 32.4 33.0   RDW 19.7* 19.6* 19.5*   PLT See Reflexed IPF Result See Reflexed IPF Result See Reflexed IPF Result   MPV NOT REPORTED NOT REPORTED NOT REPORTED      BMP: Recent Labs     09/23/19 1905 09/24/19 0353 09/25/19 0411    137 136   K 4.3 5.0 3.9   CL 94* 97* 97*   CO2 21 20 21   BUN 69* 72* 66*   CREATININE 13.47* 13.72* 11.28*   GLUCOSE 78 88 116*   CALCIUM 7.0* 6.4* 6.7*       ASSESSMENT      1..  End-stage renal disease on maintenance dialysis. Currently he is receiving 4 exchanges a day with alternate of 1.5 and 2.5. Increase in FiO2 and last 24 hours may be due to worsening of pneumonia. Patient is on broad-spectrum antibiotic. 2.  Severe reflux resulting in aspiration and pneumonia. Patient is on broad-spectrum antibiotic  3. Status post left nephrectomy with recurrence patient is on chemo  4. Status post bilateral lung transplant  5. Malnutrition     Plan:  1. Chest x-ray today  2.   Please maintain intake and output

## 2019-09-25 NOTE — PROGRESS NOTES
clinic    Discussed with patient and nursing. Zoraida Thompson MD  PGY-2, [facility]   Pager - [phone]    Attending Cardiologist Addendum: I have reviewed and performed the history, physical, subjective, objective, assessment, and plan with the resident/fellow and agree with the note. I performed the history and physical personally. I have made changes to the note above as needed. NSTEMI type 2  - Await 2d Echo, if normal LVEF and no wall motion abnormalities, no further cardiac work up, follow up as outpatient for potential stress test once improved. Thank you for allowing me to participate in the care of this patient, please do not hesitate to call if you have any questions. Ana Weeks DO, [facility], [facility]  [facility]. [facility]  [phone]

## 2019-09-25 NOTE — PLAN OF CARE
Problem: Risk for Impaired Skin Integrity  Goal: Tissue integrity - skin and mucous membranes  Description  Structural intactness and normal physiological function of skin and  mucous membranes.   9/25/2019 0955 by Irena Amaya RN  Outcome: Ongoing     Problem: Falls - Risk of:  Goal: Will remain free from falls  Description  Will remain free from falls  9/25/2019 0955 by Irena Amaya RN  Outcome: Ongoing     Problem: Falls - Risk of:  Goal: Absence of physical injury  Description  Absence of physical injury  9/25/2019 0955 by Irena Amaya RN  Outcome: Ongoing     Problem: Discharge Planning:  Goal: Participates in care planning  Description  Participates in care planning  9/25/2019 0955 by Irena Amaya RN  Outcome: Ongoing     Problem: Discharge Planning:  Goal: Discharged to appropriate level of care  Description  Discharged to appropriate level of care  9/25/2019 0955 by Irena Amaya RN  Outcome: Ongoing     Problem: Airway Clearance - Ineffective:  Goal: Ability to maintain a clear airway will improve  Description  Ability to maintain a clear airway will improve  9/25/2019 0955 by Irena Amaya RN  Outcome: Ongoing     Problem: Aspiration:  Goal: Absence of aspiration  Description  Absence of aspiration  9/25/2019 0955 by Irena Amaya RN  Outcome: Ongoing     Problem: Cardiac Output - Decreased:  Goal: Hemodynamic stability will improve  Description  Hemodynamic stability will improve  9/25/2019 0955 by Irena Amaya RN  Outcome: Ongoing     Problem: Fluid Volume - Imbalance:  Goal: Absence of imbalanced fluid volume signs and symptoms  Description  Absence of imbalanced fluid volume signs and symptoms  9/25/2019 0955 by Irena Amaya RN  Outcome: Ongoing     Problem: Gas Exchange - Impaired:  Goal: Levels of oxygenation will improve  Description  Levels of oxygenation will improve  9/25/2019 0955 by Irena Amaya RN  Outcome: Ongoing     Problem: Nutrition Deficit:  Goal: Ability to achieve adequate nutritional intake will improve  Description  Ability to achieve adequate nutritional intake will improve  9/25/2019 0955 by Shane Trejo RN  Outcome: Ongoing     Problem: Pain:  Goal: Pain level will decrease  Description  Pain level will decrease  9/25/2019 0955 by Shane Trejo RN  Outcome: Ongoing     Problem: Pain:  Goal: Recognizes and communicates pain  Description  Recognizes and communicates pain  9/25/2019 0955 by Shane Trejo RN  Outcome: Ongoing     Problem: Pain:  Goal: Control of acute pain  Description  Control of acute pain  9/25/2019 0955 by Shane Trejo RN  Outcome: Ongoing     Problem: Pain:  Goal: Control of chronic pain  Description  Control of chronic pain  9/25/2019 0955 by Shane Trejo RN  Outcome: Ongoing     Problem: Sleep Pattern Disturbance:  Goal: Appears well-rested  Description  Appears well-rested  9/25/2019 0955 by Shane Trejo RN  Outcome: Ongoing     Problem: Tissue Perfusion, Altered:  Goal: Circulatory function within specified parameters  Description  Circulatory function within specified parameters  9/25/2019 0955 by Shane Trejo RN  Outcome: Ongoing     Problem: Tissue Perfusion - Cardiopulmonary, Altered:  Goal: Absence of angina  Description  Absence of angina  9/25/2019 0955 by Shane Trejo RN  Outcome: Ongoing     Problem: Tissue Perfusion - Cardiopulmonary, Altered:  Goal: Hemodynamic stability will improve  Description  Hemodynamic stability will improve  9/25/2019 0955 by Shane Trejo RN  Outcome: Ongoing

## 2019-09-25 NOTE — PROGRESS NOTES
Intravenous Daily    heparin (porcine)  5,000 Units Subcutaneous 3 times per day    dianeal lo-emily 1.5%  2,500 mL Intraperitoneal Q6H    meropenem  500 mg Intravenous Q24H    vancomycin (VANCOCIN) intermittent dosing (placeholder)   Other RX Placeholder    hydrocortisone sodium succinate PF  100 mg Intravenous Q8H     Continuous Infusions:   sodium chloride 50 mL/hr at 09/25/19 0144    norepinephrine Stopped (09/24/19 0114)     PRN Meds:     chlorproMAZINE 25 mg TID PRN   sodium chloride flush 10 mL PRN   ondansetron 4 mg Q6H PRN   acetaminophen 650 mg Q4H PRN   fentanNYL 25 mcg Q2H PRN   loperamide 2 mg TID PRN         VENT SETTINGS (Comprehensive) (if applicable):  Vent Information  Skin Assessment: Clean, dry, & intact  FiO2 : 70 %  Humidification Source: Heated wire  Humidification Temp: 33  Additional Respiratory  Assessments  Pulse: 85  Resp: 15  SpO2: 98 %  Humidification Source: Heated wire  Humidification Temp: 33    ABGs:     Laboratory findings:    Complete Blood Count:   Recent Labs     09/23/19 1905 09/24/19 0353 09/25/19 0411   WBC 9.1 7.0 4.8   HGB 11.1* 10.5* 9.4*   HCT 33.9* 32.4* 28.5*   PLT See Reflexed IPF Result See Reflexed IPF Result See Reflexed IPF Result        Last 3 Blood Glucose:   Recent Labs     09/23/19 1905 09/24/19  0353 09/25/19  0411   GLUCOSE 78 88 116*        PT/INR:    Lab Results   Component Value Date    PROTIME 11.5 09/23/2019    INR 1.1 09/23/2019     PTT:    Lab Results   Component Value Date    APTT 22.2 09/23/2019       Comprehensive Metabolic Profile:   Recent Labs     09/23/19 1905 09/24/19  0353 09/25/19  0411    137 136   K 4.3 5.0 3.9   CL 94* 97* 97*   CO2 21 20 21   BUN 69* 72* 66*   CREATININE 13.47* 13.72* 11.28*   GLUCOSE 78 88 116*   CALCIUM 7.0* 6.4* 6.7*   PROT 4.6*  --   --    LABALBU 2.0*  --   --    BILITOT 0.41  --   --    ALKPHOS 74  --   --    AST 48*  --   --    ALT 22  --   --       Magnesium: No results found for: MG  Phosphorus:

## 2019-09-26 NOTE — PROGRESS NOTES
Continuous Infusions:   sodium chloride 25 mL/hr at 09/26/19 0000    norepinephrine Stopped (09/24/19 0114)         Labs:     CBC:   Recent Labs     09/24/19  0353 09/25/19  0411 09/26/19  0422   WBC 7.0 4.8 7.1   HGB 10.5* 9.4* 10.8*   PLT See Reflexed IPF Result See Reflexed IPF Result 78*     BMP:    Recent Labs     09/24/19  0353 09/25/19  0411 09/26/19  0422    136 137   K 5.0 3.9 3.3*   CL 97* 97* 93*   CO2 20 21 22   BUN 72* 66* 64*   CREATININE 13.72* 11.28* 9.44*   GLUCOSE 88 116* 159*     Hepatic:   Recent Labs     09/23/19 1905   AST 48*   ALT 22   BILITOT 0.41   ALKPHOS 74     Troponin: No results for input(s): TROPONINI in the last 72 hours. BNP: No results for input(s): BNP in the last 72 hours. Lipids: No results for input(s): CHOL, HDL in the last 72 hours. Invalid input(s): LDLCALCU  INR:   Recent Labs     09/23/19 1905   INR 1.1       Other active acute problems:  Patient Active Problem List   Diagnosis    Diverticulitis of intestine with perforation    [facility]    Osteoporosis    Vitamin D deficiency    Accelerated rejection of lung transplant ([facility]    Bronchiolitis obliterans syndrome ([facility]    Edema    Sepsis ([facility]    Sepsis ([facility]    Peritonitis ([facility]    Acute postoperative respiratory insufficiency    Immunosuppressed status ([facility]    Acute blood loss anemia    Intra-abdominal abscess (HCC)    Hypoalbuminemia    Hypophosphatemia    Hypomagnesemia    Closed fracture of left distal radius    Pneumonia due to organism    Septic shock (HCC)    Aspiration pneumonia of right upper lobe (HCC)    Lactic acidosis    Acute respiratory failure with hypoxia (HCC)         IMPRESSION & Recommendations:    1. Troponinemia: type II MI secondary to sepsis and RF. Echo reviewed. EF 59% with mild MR and technically difficult study. 2. Pneumonia: Likely RUL, antibiotics started per primary  3. Sepsis: likely from pneumonia  4.  ESRD: Elevated creatinine, left nephrectomy, on peritoneal dialysis  5. COPD s/p bilateral lung transplant. [facility]ing for transfer [email or web address]. Cardiology will sign off. Discussed with patient, family, and Nurse. Brenda Underwood MD, Resident  [facility]. [facility]  [facility]  9/26/2019 10:57 AM      Attending Cardiologist Addendum: I have reviewed and performed the history, physical, subjective, objective, assessment, and plan with the resident/fellow and agree with the note. I performed the history and physical personally. I have made changes to the note above as needed. Thank you for allowing me to participate in the care of this patient, please do not hesitate to call if you have any questions.     Nelda Cano MD MSc. [facility]  [phone]

## 2019-09-26 NOTE — PROGRESS NOTES
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 POS LN and chemo started only 5/2019 and stopped recently. Has been active and developed a severe reflux over night and admitted ill feeling through the ER. Was wheezing and raling on the right chest w right chest pain, weak and no fever - no cough- dry mouth - elevated lactic acid and troponins, normal WBC. Hypotension and started on low dose of levophed. He has ESRD on PD w low BP ususally in the 90's. We are called for antibiotics managementand right UL pneumonia.  gotten vanco and zosyn one dose each. Alert Ox3  No rash  abd soft  But getting PD. Dry mouth        Interval changes  9/26/2019   No overnight events, says feeling much better   Pt continues to be on High flow oxygen  Denies any pain, feels tired though  Coughing a lot of phlem  Afebrile and vitally stable   Dialysis dependent       CXR worsening RUL pneumonia   blood culture still neg    I have personally reviewed the past medical history, past surgical history, medications, social history, and family history, and I haveupdated the database accordingly.   Past Medical History:     Past Medical History:   Diagnosis Date    Accelerated rejection of lung transplant ([address]     Acute blood loss anemia     Acute postoperative respiratory insufficiency     Bronchiolitis obliterans syndrome (HCC)     Cancer (HCC)     COPD (chronic obstructive pulmonary disease) (HCC)     Diverticulitis     Hypoalbuminemia     Hypomagnesemia     Hypophosphatemia     Immunosuppression (HCC)     Intra-abdominal abscess (HCC)     Osteoporosis     Peritonitis ([address]     Vitamin D deficiency        Past Surgical  History:     Past Surgical History:   Procedure Laterality Date    LEG SURGERY      LUNG TRANSPLANT, DOUBLE      SMALL INTESTINE SURGERY contextual diversion. Jonna Holman MD  Office: [phone]  Perfect serve / office [phone]          I have discussed the care of the patient, including pertinent history and exam findings,  with the resident. I have seen and examined the patient and the key elements of all parts of the encounter have been performed by me. I agree with the assessment, plan and orders as documented by the resident.     Autumn Marmolejo, Infectious Diseases

## 2019-09-26 NOTE — PROGRESS NOTES
SUBJECTIVE    Patient seen and examined. Hemodynamically stable. Patient states that he is feeling much better as compared to yesterday. There has no change in FiO2 but oxygen saturation is up to 99 200%. His pulse rate is better 2. Intake and output shows a negative fluid balance of 340  Patient denies any nausea and vomiting. Coughing up and bringing secretions up.   OBJECTIVE      CURRENT TEMPERATURE:  Temp: 97.3 °F (36.3 °C)  MAXIMUM TEMPERATURE OVER 24HRS:  Temp (24hrs), Av.2 °F (35.7 °C), Min:93.9 °F (34.4 °C), Max:97.4 °F (36.3 °C)    CURRENT RESPIRATORY RATE:  Resp: 8  CURRENT PULSE:  Pulse: 71  CURRENT BLOOD PRESSURE:  BP: 125/80  24HR BLOOD PRESSURE RANGE:  Systolic (42FTX), UAU:599 , Min:80 , LIBBY:538   ; Diastolic (32SYK), PFK:29, Min:55, Max:93    24HR INTAKE/OUTPUT:      Intake/Output Summary (Last 24 hours) at 2019 1013  Last data filed at 2019 0800  Gross per 24 hour   Intake 8706 ml   Output 9050 ml   Net -344 ml     WEIGHT :  Patient Vitals for the past 96 hrs (Last 3 readings):   Weight   19 0100 118 lb 2.7 oz (53.6 kg)   19 2101 120 lb (54.4 kg)     PHYSICAL EXAM      GENERAL APPEARANCE: Comfortable  SKIN: To touch and no erythema  ENT: No pharyngeal congestion  NECK: JVD  PULMONARY: Lateral air entry and clear to auscultation  CADRDIOVASCULAR: S1 and S2 audible no S3  ABDOMEN: soft nontender, bowel sounds present, no organomegaly,  no ascites   EXTREMITIES: No edema    CURRENT MEDICATIONS        vancomycin (VANCOCIN) 500 mg in dextrose 5 % 100 mL IVPB (mini-bag) Once   [START ON 2019] levofloxacin (LEVAQUIN) 500 MG/100ML infusion 500 mg Q48H   chlorproMAZINE (THORAZINE) tablet 25 mg TID PRN   dianeal lo-emily 2.5% 2,500 mL Q6H   aspirin EC tablet 81 mg Daily   azithromycin (ZITHROMAX) tablet 250 mg Once per day on    sulfamethoxazole-trimethoprim (BACTRIM;SEPTRA) 400-80 MG per tablet 1 tablet 2 times per day on    tacrolimus (proGRAF) capsule 1.5 mg BID   sodium chloride flush 0.9 % injection 10 mL 2 times per day   sodium chloride flush 0.9 % injection 10 mL PRN   ondansetron (ZOFRAN) injection 4 mg Q6H PRN   famotidine (PEPCID) injection 20 mg Daily   acetaminophen (TYLENOL) tablet 650 mg Q4H PRN   heparin (porcine) injection 5,000 Units 3 times per day   0.9 % sodium chloride infusion Continuous   fentaNYL (SUBLIMAZE) injection 25 mcg Q2H PRN   meropenem (MERREM) 500 mg IVPB minibag Q24H   vancomycin (VANCOCIN) intermittent dosing (placeholder) RX Placeholder   loperamide (IMODIUM) capsule 2 mg TID PRN   norepinephrine (LEVOPHED) 16 mg in dextrose 5 % 250 mL infusion Continuous   hydrocortisone sodium succinate PF (SOLU-CORTEF) injection 100 mg Q8H         LABS      CBC:   Recent Labs     09/24/19  0353 09/25/19 0411 09/26/19 0422   WBC 7.0 4.8 7.1   RBC 2.97* 2.67* 3.06*   HGB 10.5* 9.4* 10.8*   HCT 32.4* 28.5* 31.5*   .1* 106.7* 102.9   MCH 35.4* 35.2* 35.3*   MCHC 32.4 33.0 34.3   RDW 19.6* 19.5* 19.2*   PLT See Reflexed IPF Result See Reflexed IPF Result 78*   MPV NOT REPORTED NOT REPORTED 12.2      BMP:   Recent Labs     09/24/19  0353 09/25/19 0411 09/26/19 0422    136 137   K 5.0 3.9 3.3*   CL 97* 97* 93*   CO2 20 21 22   BUN 72* 66* 64*   CREATININE 13.72* 11.28* 9.44*   GLUCOSE 88 116* 159*   CALCIUM 6.4* 6.7* 6.9*       ASSESSMENT      1..  End-stage renal disease on maintenance dialysis. Now patient is receiving exchanges with 2.5%. Chest x-ray does not shows worsening of pulmonary congestion. 2.  Pneumonia on broad-spectrum antibiotic   3. Status post left nephrectomy with recurrence patient is on chemo  4. Status post bilateral lung transplant  5. Malnutrition  6. Hypokalemia: Patient is receiving supplements     Plan:   1. Continue exchanges with 2.5%   2. If it is okay with hospital policy can used cycler at night   3.   Ensure 1 can 3 times daily with meals   We will follow with    Please do not hesitate to call with questions.     Electronically signed by Tameka Da Silva MD on 9/26/2019 at 10:13 AM

## 2019-09-26 NOTE — PLAN OF CARE
signs and symptoms  Description  Absence of imbalanced fluid volume signs and symptoms  9/25/2019 2304 by Anders Donaldson RN  Outcome: Ongoing  9/25/2019 0955 by Jyoti Cox RN  Outcome: Ongoing     Problem: Gas Exchange - Impaired:  Goal: Levels of oxygenation will improve  Description  Levels of oxygenation will improve  9/25/2019 2304 by Anders Donaldson RN  Outcome: Ongoing  9/25/2019 0955 by Jyoti Cox RN  Outcome: Ongoing     Problem: Nutrition Deficit:  Goal: Ability to achieve adequate nutritional intake will improve  Description  Ability to achieve adequate nutritional intake will improve  9/25/2019 2304 by Anders Donaldson RN  Outcome: Ongoing  9/25/2019 0955 by Jyoti Cox RN  Outcome: Ongoing     Problem: Pain:  Goal: Pain level will decrease  Description  Pain level will decrease  9/25/2019 2304 by Anders Donaldson RN  Outcome: Ongoing  9/25/2019 0955 by Jyoti Cox RN  Outcome: Ongoing  Goal: Recognizes and communicates pain  Description  Recognizes and communicates pain  9/25/2019 2304 by Anders Donaldson RN  Outcome: Ongoing  9/25/2019 0955 by Jyoti Cox RN  Outcome: Ongoing  Goal: Control of acute pain  Description  Control of acute pain  9/25/2019 2304 by Anders Donaldson RN  Outcome: Ongoing  9/25/2019 0955 by Jyoti Cox RN  Outcome: Ongoing  Goal: Control of chronic pain  Description  Control of chronic pain  9/25/2019 2304 by Anders Donaldson RN  Outcome: Ongoing  9/25/2019 0955 by Jyoti Cox RN  Outcome: Ongoing     Problem: Sleep Pattern Disturbance:  Goal: Appears well-rested  Description  Appears well-rested  9/25/2019 2304 by Anders Donaldson RN  Outcome: Ongoing  9/25/2019 0955 by Jyoti Cox RN  Outcome: Ongoing     Problem: Tissue Perfusion, Altered:  Goal: Circulatory function within specified parameters  Description  Circulatory function within specified parameters  9/25/2019 2304 by Anders Donaldson RN  Outcome: Ongoing  9/25/2019 0955 by Jyoti Cox RN  Outcome: Ongoing     Problem: Tissue Perfusion - Cardiopulmonary, Altered:  Goal: Absence of angina  Description  Absence of angina  9/25/2019 2304 by Ketan Kumar RN  Outcome: Ongoing  9/25/2019 0955 by Letty Munoz RN  Outcome: Ongoing  Goal: Hemodynamic stability will improve  Description  Hemodynamic stability will improve  9/25/2019 2304 by Ketan Kumar RN  Outcome: Ongoing  9/25/2019 0955 by Letty Munoz RN  Outcome: Ongoing     Problem: Pain:  Goal: Pain level will decrease  Description  Pain level will decrease  9/25/2019 2304 by Ketan Kumar RN  Outcome: Ongoing  9/25/2019 0955 by Letty Munoz RN  Outcome: Ongoing  Goal: Control of acute pain  Description  Control of acute pain  Outcome: Ongoing  Goal: Control of chronic pain  Description  Control of chronic pain  Outcome: Ongoing

## 2019-09-27 NOTE — PLAN OF CARE
Patient given 4 mg zofran and 25 mg fentanyl for EMS transfer. Patient belongings were taken with Dotty Males, wife. En route to [facility].

## 2019-09-27 NOTE — DISCHARGE SUMMARY
Take 5 mg by mouth. azithromycin (ZITHROMAX) 250 MG tablet Take 250 mg by mouth daily. omeprazole (PRILOSEC) 20 MG capsule Take 20 mg by mouth daily. Cholecalciferol (VITAMIN D3) 60947 UNITS CAPS Take  by mouth. Multiple Vitamins-Minerals (THERAPEUTIC MULTIVITAMIN-MINERALS) tablet Take 1 tablet by mouth.      montelukast (SINGULAIR) 10 MG tablet Take 10 mg by mouth nightly. calcium carbonate (OSCAL) 500 MG TABS tablet Take 500 mg by mouth 2 times daily.            Activity: activity as tolerated    Diet: regular diet    Disposition: [facility]    Electronically signed by Zabrina Mcmillan MD on 9/27/2019 at 2:39 PM   .

## 2020-01-01 ENCOUNTER — APPOINTMENT (OUTPATIENT)
Dept: GENERAL RADIOLOGY | Age: 65
DRG: 871 | End: 2020-01-01
Payer: MEDICARE

## 2020-01-01 ENCOUNTER — HOSPITAL ENCOUNTER (INPATIENT)
Age: 65
LOS: 1 days | Discharge: HOME | DRG: 871 | End: 2020-02-06
Attending: EMERGENCY MEDICINE | Admitting: INTERNAL MEDICINE
Payer: MEDICARE

## 2020-01-01 VITALS
OXYGEN SATURATION: 99 % | HEIGHT: 66 IN | RESPIRATION RATE: 15 BRPM | SYSTOLIC BLOOD PRESSURE: 86 MMHG | DIASTOLIC BLOOD PRESSURE: 72 MMHG | BODY MASS INDEX: 15.94 KG/M2 | HEART RATE: 90 BPM | WEIGHT: 99.21 LBS | TEMPERATURE: 96.8 F

## 2020-01-01 LAB
ABSOLUTE EOS #: 0.15 K/UL (ref 0–0.4)
ABSOLUTE IMMATURE GRANULOCYTE: 0 K/UL (ref 0–0.3)
ABSOLUTE LYMPH #: 3.08 K/UL (ref 1–4.8)
ABSOLUTE MONO #: 0.31 K/UL (ref 0.1–0.8)
ALLEN TEST: ABNORMAL
ANION GAP SERPL CALCULATED.3IONS-SCNC: 18 MMOL/L (ref 9–17)
ANION GAP: 20 MMOL/L (ref 7–16)
BASOPHILS # BLD: 0 % (ref 0–2)
BASOPHILS ABSOLUTE: 0 K/UL (ref 0–0.2)
BNP INTERPRETATION: ABNORMAL
BUN BLDV-MCNC: 33 MG/DL (ref 8–23)
BUN/CREAT BLD: ABNORMAL (ref 9–20)
CALCIUM SERPL-MCNC: 7.1 MG/DL (ref 8.6–10.4)
CHLORIDE BLD-SCNC: 96 MMOL/L (ref 98–107)
CO2: 20 MMOL/L (ref 20–31)
CREAT SERPL-MCNC: 9.52 MG/DL (ref 0.7–1.2)
DIFFERENTIAL TYPE: ABNORMAL
EKG ATRIAL RATE: 103 BPM
EKG P AXIS: 103 DEGREES
EKG P-R INTERVAL: 156 MS
EKG Q-T INTERVAL: 388 MS
EKG QRS DURATION: 76 MS
EKG QTC CALCULATION (BAZETT): 508 MS
EKG R AXIS: 157 DEGREES
EKG T AXIS: 75 DEGREES
EKG VENTRICULAR RATE: 103 BPM
EOSINOPHILS RELATIVE PERCENT: 1 % (ref 1–4)
FIO2: 100
GFR AFRICAN AMERICAN: 7 ML/MIN
GFR NON-AFRICAN AMERICAN: 6 ML/MIN
GFR NON-AFRICAN AMERICAN: 7 ML/MIN
GFR SERPL CREATININE-BSD FRML MDRD: 8 ML/MIN
GFR SERPL CREATININE-BSD FRML MDRD: ABNORMAL ML/MIN/{1.73_M2}
GLUCOSE BLD-MCNC: 118 MG/DL (ref 74–100)
GLUCOSE BLD-MCNC: 68 MG/DL (ref 70–99)
HCT VFR BLD CALC: 40.8 % (ref 40.7–50.3)
HEMOGLOBIN: 12 G/DL (ref 13–17)
IMMATURE GRANULOCYTES: 0 %
INR BLD: 1.4
INTERVENTION: NORMAL
INTERVENTION: NORMAL
LACTIC ACID, WHOLE BLOOD: 3.3 MMOL/L (ref 0.7–2.1)
LACTIC ACID, WHOLE BLOOD: 5.1 MMOL/L (ref 0.7–2.1)
LACTIC ACID, WHOLE BLOOD: 9.6 MMOL/L (ref 0.7–2.1)
LACTIC ACID: ABNORMAL MMOL/L
LACTIC ACID: ABNORMAL MMOL/L
LYMPHOCYTES # BLD: 20 % (ref 24–44)
MAGNESIUM: 1.5 MG/DL (ref 1.6–2.6)
MCH RBC QN AUTO: 29.6 PG (ref 25.2–33.5)
MCHC RBC AUTO-ENTMCNC: 29.4 G/DL (ref 28.4–34.8)
MCV RBC AUTO: 100.5 FL (ref 82.6–102.9)
MODE: ABNORMAL
MONOCYTES # BLD: 2 % (ref 1–7)
MORPHOLOGY: ABNORMAL
MRSA, DNA, NASAL: NORMAL
NEGATIVE BASE EXCESS, ART: 10 (ref 0–2)
NRBC AUTOMATED: 0.7 PER 100 WBC
O2 DEVICE/FLOW/%: ABNORMAL
PATIENT TEMP: ABNORMAL
PDW BLD-RTO: 21.2 % (ref 11.8–14.4)
PLATELET # BLD: ABNORMAL K/UL (ref 138–453)
PLATELET ESTIMATE: ABNORMAL
PLATELET, FLUORESCENCE: 62 K/UL (ref 138–453)
PLATELET, IMMATURE FRACTION: 12.4 % (ref 1.1–10.3)
PMV BLD AUTO: ABNORMAL FL (ref 8.1–13.5)
POC CHLORIDE: 101 MMOL/L (ref 98–107)
POC CREATININE: 8.29 MG/DL (ref 0.51–1.19)
POC HCO3: 12.1 MMOL/L (ref 21–28)
POC HEMATOCRIT: 34 % (ref 41–53)
POC HEMOGLOBIN: 11.4 G/DL (ref 13.5–17.5)
POC IONIZED CALCIUM: 0.98 MMOL/L (ref 1.15–1.33)
POC LACTIC ACID: 10.45 MMOL/L (ref 0.56–1.39)
POC O2 SATURATION: 100 % (ref 94–98)
POC PCO2 TEMP: ABNORMAL MM HG
POC PCO2: 17.8 MM HG (ref 35–48)
POC PH TEMP: ABNORMAL
POC PH: 7.44 (ref 7.35–7.45)
POC PO2 TEMP: ABNORMAL MM HG
POC PO2: 243.6 MM HG (ref 83–108)
POC POTASSIUM: 2.9 MMOL/L (ref 3.5–4.5)
POC SODIUM: 133 MMOL/L (ref 138–146)
POSITIVE BASE EXCESS, ART: ABNORMAL (ref 0–3)
POTASSIUM SERPL-SCNC: 3.6 MMOL/L (ref 3.7–5.3)
PRO-BNP: ABNORMAL PG/ML
PROTHROMBIN TIME: 14 SEC (ref 9–12)
RBC # BLD: 4.06 M/UL (ref 4.21–5.77)
RBC # BLD: ABNORMAL 10*6/UL
SAMPLE SITE: ABNORMAL
SEG NEUTROPHILS: 77 % (ref 36–66)
SEGMENTED NEUTROPHILS ABSOLUTE COUNT: 11.86 K/UL (ref 1.8–7.7)
SODIUM BLD-SCNC: 134 MMOL/L (ref 135–144)
SPECIMEN DESCRIPTION: NORMAL
TCO2 (CALC), ART: 13 MMOL/L (ref 22–29)
TROPONIN INTERP: ABNORMAL
TROPONIN T: ABNORMAL NG/ML
TROPONIN, HIGH SENSITIVITY: 101 NG/L (ref 0–22)
TROPONIN, HIGH SENSITIVITY: 87 NG/L (ref 0–22)
TROPONIN, HIGH SENSITIVITY: 90 NG/L (ref 0–22)
WBC # BLD: 15.4 K/UL (ref 3.5–11.3)
WBC # BLD: ABNORMAL 10*3/UL

## 2020-01-01 PROCEDURE — 85025 COMPLETE CBC W/AUTO DIFF WBC: CPT

## 2020-01-01 PROCEDURE — 97530 THERAPEUTIC ACTIVITIES: CPT

## 2020-01-01 PROCEDURE — 2580000003 HC RX 258: Performed by: EMERGENCY MEDICINE

## 2020-01-01 PROCEDURE — 2580000003 HC RX 258: Performed by: STUDENT IN AN ORGANIZED HEALTH CARE EDUCATION/TRAINING PROGRAM

## 2020-01-01 PROCEDURE — 6370000000 HC RX 637 (ALT 250 FOR IP): Performed by: STUDENT IN AN ORGANIZED HEALTH CARE EDUCATION/TRAINING PROGRAM

## 2020-01-01 PROCEDURE — 85055 RETICULATED PLATELET ASSAY: CPT

## 2020-01-01 PROCEDURE — 84295 ASSAY OF SERUM SODIUM: CPT

## 2020-01-01 PROCEDURE — 99222 1ST HOSP IP/OBS MODERATE 55: CPT | Performed by: FAMILY MEDICINE

## 2020-01-01 PROCEDURE — 84484 ASSAY OF TROPONIN QUANT: CPT

## 2020-01-01 PROCEDURE — 82947 ASSAY GLUCOSE BLOOD QUANT: CPT

## 2020-01-01 PROCEDURE — 3E1M39Z IRRIGATION OF PERITONEAL CAVITY USING DIALYSATE, PERCUTANEOUS APPROACH: ICD-10-PCS | Performed by: INTERNAL MEDICINE

## 2020-01-01 PROCEDURE — 94761 N-INVAS EAR/PLS OXIMETRY MLT: CPT

## 2020-01-01 PROCEDURE — 99232 SBSQ HOSP IP/OBS MODERATE 35: CPT | Performed by: INTERNAL MEDICINE

## 2020-01-01 PROCEDURE — 36415 COLL VENOUS BLD VENIPUNCTURE: CPT

## 2020-01-01 PROCEDURE — 36600 WITHDRAWAL OF ARTERIAL BLOOD: CPT

## 2020-01-01 PROCEDURE — 99291 CRITICAL CARE FIRST HOUR: CPT | Performed by: INTERNAL MEDICINE

## 2020-01-01 PROCEDURE — 82330 ASSAY OF CALCIUM: CPT

## 2020-01-01 PROCEDURE — 83605 ASSAY OF LACTIC ACID: CPT

## 2020-01-01 PROCEDURE — 80048 BASIC METABOLIC PNL TOTAL CA: CPT

## 2020-01-01 PROCEDURE — 99292 CRITICAL CARE ADDL 30 MIN: CPT | Performed by: INTERNAL MEDICINE

## 2020-01-01 PROCEDURE — 99233 SBSQ HOSP IP/OBS HIGH 50: CPT | Performed by: INTERNAL MEDICINE

## 2020-01-01 PROCEDURE — 93010 ELECTROCARDIOGRAM REPORT: CPT | Performed by: INTERNAL MEDICINE

## 2020-01-01 PROCEDURE — 99222 1ST HOSP IP/OBS MODERATE 55: CPT | Performed by: INTERNAL MEDICINE

## 2020-01-01 PROCEDURE — 2580000003 HC RX 258: Performed by: INTERNAL MEDICINE

## 2020-01-01 PROCEDURE — 6370000000 HC RX 637 (ALT 250 FOR IP): Performed by: EMERGENCY MEDICINE

## 2020-01-01 PROCEDURE — 6360000002 HC RX W HCPCS: Performed by: STUDENT IN AN ORGANIZED HEALTH CARE EDUCATION/TRAINING PROGRAM

## 2020-01-01 PROCEDURE — 87641 MR-STAPH DNA AMP PROBE: CPT

## 2020-01-01 PROCEDURE — 82803 BLOOD GASES ANY COMBINATION: CPT

## 2020-01-01 PROCEDURE — 97162 PT EVAL MOD COMPLEX 30 MIN: CPT

## 2020-01-01 PROCEDURE — 85610 PROTHROMBIN TIME: CPT

## 2020-01-01 PROCEDURE — 99285 EMERGENCY DEPT VISIT HI MDM: CPT

## 2020-01-01 PROCEDURE — 2000000000 HC ICU R&B

## 2020-01-01 PROCEDURE — 6360000002 HC RX W HCPCS: Performed by: EMERGENCY MEDICINE

## 2020-01-01 PROCEDURE — 2700000000 HC OXYGEN THERAPY PER DAY

## 2020-01-01 PROCEDURE — 84132 ASSAY OF SERUM POTASSIUM: CPT

## 2020-01-01 PROCEDURE — 97535 SELF CARE MNGMENT TRAINING: CPT

## 2020-01-01 PROCEDURE — 83880 ASSAY OF NATRIURETIC PEPTIDE: CPT

## 2020-01-01 PROCEDURE — 93005 ELECTROCARDIOGRAM TRACING: CPT | Performed by: EMERGENCY MEDICINE

## 2020-01-01 PROCEDURE — 82565 ASSAY OF CREATININE: CPT

## 2020-01-01 PROCEDURE — 83735 ASSAY OF MAGNESIUM: CPT

## 2020-01-01 PROCEDURE — 71045 X-RAY EXAM CHEST 1 VIEW: CPT

## 2020-01-01 PROCEDURE — 97166 OT EVAL MOD COMPLEX 45 MIN: CPT

## 2020-01-01 PROCEDURE — 82435 ASSAY OF BLOOD CHLORIDE: CPT

## 2020-01-01 PROCEDURE — 85014 HEMATOCRIT: CPT

## 2020-01-01 PROCEDURE — 76937 US GUIDE VASCULAR ACCESS: CPT

## 2020-01-01 RX ORDER — FENTANYL CITRATE 50 UG/ML
50 INJECTION, SOLUTION INTRAMUSCULAR; INTRAVENOUS ONCE
Status: COMPLETED | OUTPATIENT
Start: 2020-01-01 | End: 2020-01-01

## 2020-01-01 RX ORDER — MIDODRINE HYDROCHLORIDE 5 MG/1
5 TABLET ORAL ONCE
Status: COMPLETED | OUTPATIENT
Start: 2020-01-01 | End: 2020-01-01

## 2020-01-01 RX ORDER — ONDANSETRON 2 MG/ML
4 INJECTION INTRAMUSCULAR; INTRAVENOUS EVERY 6 HOURS PRN
Status: DISCONTINUED | OUTPATIENT
Start: 2020-01-01 | End: 2020-01-01 | Stop reason: HOSPADM

## 2020-01-01 RX ORDER — 0.9 % SODIUM CHLORIDE 0.9 %
500 INTRAVENOUS SOLUTION INTRAVENOUS ONCE
Status: COMPLETED | OUTPATIENT
Start: 2020-01-01 | End: 2020-01-01

## 2020-01-01 RX ORDER — SODIUM CHLORIDE, SODIUM LACTATE, POTASSIUM CHLORIDE, AND CALCIUM CHLORIDE .6; .31; .03; .02 G/100ML; G/100ML; G/100ML; G/100ML
250 INJECTION, SOLUTION INTRAVENOUS ONCE
Status: COMPLETED | OUTPATIENT
Start: 2020-01-01 | End: 2020-01-01

## 2020-01-01 RX ORDER — FAMOTIDINE 20 MG/1
20 TABLET, FILM COATED ORAL ONCE
Status: COMPLETED | OUTPATIENT
Start: 2020-01-01 | End: 2020-01-01

## 2020-01-01 RX ORDER — MAGNESIUM SULFATE 1 G/100ML
1 INJECTION INTRAVENOUS ONCE
Status: COMPLETED | OUTPATIENT
Start: 2020-01-01 | End: 2020-01-01

## 2020-01-01 RX ORDER — SODIUM CHLORIDE, SODIUM LACTATE, CALCIUM CHLORIDE, MAGNESIUM CHLORIDE AND DEXTROSE 1.5; 538; 448; 18.3; 5.08 G/100ML; MG/100ML; MG/100ML; MG/100ML; MG/100ML
2000 INJECTION, SOLUTION INTRAPERITONEAL EVERY 6 HOURS SCHEDULED
Status: DISCONTINUED | OUTPATIENT
Start: 2020-01-01 | End: 2020-01-01 | Stop reason: HOSPADM

## 2020-01-01 RX ORDER — HEPARIN SODIUM 5000 [USP'U]/ML
5000 INJECTION, SOLUTION INTRAVENOUS; SUBCUTANEOUS EVERY 8 HOURS SCHEDULED
Status: DISCONTINUED | OUTPATIENT
Start: 2020-01-01 | End: 2020-01-01 | Stop reason: HOSPADM

## 2020-01-01 RX ORDER — SODIUM CHLORIDE 9 MG/ML
INJECTION, SOLUTION INTRAVENOUS CONTINUOUS
Status: ACTIVE | OUTPATIENT
Start: 2020-01-01 | End: 2020-01-01

## 2020-01-01 RX ORDER — DEXTROSE MONOHYDRATE 25 G/50ML
25 INJECTION, SOLUTION INTRAVENOUS ONCE
Status: COMPLETED | OUTPATIENT
Start: 2020-01-01 | End: 2020-01-01

## 2020-01-01 RX ORDER — FOLIC ACID 1 MG/1
1 TABLET ORAL DAILY
Status: DISCONTINUED | OUTPATIENT
Start: 2020-01-01 | End: 2020-01-01 | Stop reason: HOSPADM

## 2020-01-01 RX ORDER — FLUDROCORTISONE ACETATE 0.1 MG/1
0.1 TABLET ORAL 2 TIMES DAILY
Status: DISCONTINUED | OUTPATIENT
Start: 2020-01-01 | End: 2020-01-01 | Stop reason: HOSPADM

## 2020-01-01 RX ORDER — SODIUM CHLORIDE 0.9 % (FLUSH) 0.9 %
10 SYRINGE (ML) INJECTION EVERY 12 HOURS SCHEDULED
Status: DISCONTINUED | OUTPATIENT
Start: 2020-01-01 | End: 2020-01-01 | Stop reason: HOSPADM

## 2020-01-01 RX ORDER — PREDNISONE 1 MG/1
5 TABLET ORAL DAILY
Status: DISCONTINUED | OUTPATIENT
Start: 2020-01-01 | End: 2020-01-01 | Stop reason: HOSPADM

## 2020-01-01 RX ORDER — CALCIUM CARBONATE 500(1250)
500 TABLET ORAL 2 TIMES DAILY
Status: DISCONTINUED | OUTPATIENT
Start: 2020-01-01 | End: 2020-01-01 | Stop reason: HOSPADM

## 2020-01-01 RX ORDER — SODIUM CHLORIDE, SODIUM LACTATE, POTASSIUM CHLORIDE, AND CALCIUM CHLORIDE .6; .31; .03; .02 G/100ML; G/100ML; G/100ML; G/100ML
500 INJECTION, SOLUTION INTRAVENOUS ONCE
Status: COMPLETED | OUTPATIENT
Start: 2020-01-01 | End: 2020-01-01

## 2020-01-01 RX ORDER — ASPIRIN 81 MG/1
81 TABLET, CHEWABLE ORAL DAILY
Status: DISCONTINUED | OUTPATIENT
Start: 2020-01-01 | End: 2020-01-01 | Stop reason: HOSPADM

## 2020-01-01 RX ORDER — SODIUM CHLORIDE 0.9 % (FLUSH) 0.9 %
10 SYRINGE (ML) INJECTION PRN
Status: DISCONTINUED | OUTPATIENT
Start: 2020-01-01 | End: 2020-01-01 | Stop reason: HOSPADM

## 2020-01-01 RX ORDER — MORPHINE SULFATE 10 MG/5ML
4 SOLUTION ORAL EVERY 12 HOURS PRN
Status: DISCONTINUED | OUTPATIENT
Start: 2020-01-01 | End: 2020-01-01 | Stop reason: HOSPADM

## 2020-01-01 RX ORDER — MIDODRINE HYDROCHLORIDE 5 MG/1
10 TABLET ORAL
Status: DISCONTINUED | OUTPATIENT
Start: 2020-01-01 | End: 2020-01-01 | Stop reason: HOSPADM

## 2020-01-01 RX ORDER — MONTELUKAST SODIUM 10 MG/1
10 TABLET ORAL NIGHTLY
Status: DISCONTINUED | OUTPATIENT
Start: 2020-01-01 | End: 2020-01-01 | Stop reason: HOSPADM

## 2020-01-01 RX ADMIN — SODIUM CHLORIDE 500 ML: 9 INJECTION, SOLUTION INTRAVENOUS at 11:25

## 2020-01-01 RX ADMIN — SODIUM CHLORIDE, POTASSIUM CHLORIDE, SODIUM LACTATE AND CALCIUM CHLORIDE 500 ML: 600; 310; 30; 20 INJECTION, SOLUTION INTRAVENOUS at 17:21

## 2020-01-01 RX ADMIN — FAMOTIDINE 20 MG: 20 TABLET, FILM COATED ORAL at 20:46

## 2020-01-01 RX ADMIN — TACROLIMUS 1.5 MG: 1 CAPSULE ORAL at 21:28

## 2020-01-01 RX ADMIN — SODIUM CHLORIDE, SODIUM LACTATE, CALCIUM CHLORIDE, MAGNESIUM CHLORIDE AND DEXTROSE 2000 ML: 1.5; 538; 448; 18.3; 5.08 INJECTION, SOLUTION INTRAPERITONEAL at 18:34

## 2020-01-01 RX ADMIN — MIDODRINE HYDROCHLORIDE 10 MG: 5 TABLET ORAL at 20:45

## 2020-01-01 RX ADMIN — Medication 10 ML: at 21:27

## 2020-01-01 RX ADMIN — MIDODRINE HYDROCHLORIDE 10 MG: 5 TABLET ORAL at 09:13

## 2020-01-01 RX ADMIN — SODIUM CHLORIDE, POTASSIUM CHLORIDE, SODIUM LACTATE AND CALCIUM CHLORIDE 250 ML: 600; 310; 30; 20 INJECTION, SOLUTION INTRAVENOUS at 21:09

## 2020-01-01 RX ADMIN — PIPERACILLIN SODIUM AND TAZOBACTAM SODIUM 2.25 G: 2; .25 INJECTION, POWDER, LYOPHILIZED, FOR SOLUTION INTRAVENOUS at 09:30

## 2020-01-01 RX ADMIN — FOLIC ACID 1 MG: 1 TABLET ORAL at 20:46

## 2020-01-01 RX ADMIN — ONDANSETRON 4 MG: 2 INJECTION INTRAMUSCULAR; INTRAVENOUS at 09:38

## 2020-01-01 RX ADMIN — SODIUM CHLORIDE 500 ML: 9 INJECTION, SOLUTION INTRAVENOUS at 15:24

## 2020-01-01 RX ADMIN — PREDNISONE 5 MG: 5 TABLET ORAL at 09:14

## 2020-01-01 RX ADMIN — MORPHINE SULFATE 4 MG: 10 SOLUTION ORAL at 21:04

## 2020-01-01 RX ADMIN — MIDODRINE HYDROCHLORIDE 5 MG: 5 TABLET ORAL at 14:54

## 2020-01-01 RX ADMIN — SODIUM CHLORIDE 50 ML/HR: 9 INJECTION, SOLUTION INTRAVENOUS at 16:51

## 2020-01-01 RX ADMIN — FLUDROCORTISONE ACETATE 0.1 MG: 0.1 TABLET ORAL at 20:46

## 2020-01-01 RX ADMIN — PIPERACILLIN SODIUM AND TAZOBACTAM SODIUM 2.25 G: 2; .25 INJECTION, POWDER, LYOPHILIZED, FOR SOLUTION INTRAVENOUS at 00:51

## 2020-01-01 RX ADMIN — SODIUM CHLORIDE, SODIUM LACTATE, CALCIUM CHLORIDE, MAGNESIUM CHLORIDE AND DEXTROSE 2000 ML: 1.5; 538; 448; 18.3; 5.08 INJECTION, SOLUTION INTRAPERITONEAL at 00:16

## 2020-01-01 RX ADMIN — SODIUM CHLORIDE, SODIUM LACTATE, CALCIUM CHLORIDE, MAGNESIUM CHLORIDE AND DEXTROSE 2000 ML: 1.5; 538; 448; 18.3; 5.08 INJECTION, SOLUTION INTRAPERITONEAL at 11:33

## 2020-01-01 RX ADMIN — MAGNESIUM GLUCONATE 500 MG ORAL TABLET 400 MG: 500 TABLET ORAL at 20:46

## 2020-01-01 RX ADMIN — MAGNESIUM SULFATE HEPTAHYDRATE 1 G: 1 INJECTION, SOLUTION INTRAVENOUS at 00:20

## 2020-01-01 RX ADMIN — FLUDROCORTISONE ACETATE 0.1 MG: 0.1 TABLET ORAL at 09:13

## 2020-01-01 RX ADMIN — POTASSIUM BICARBONATE 20 MEQ: 782 TABLET, EFFERVESCENT ORAL at 21:36

## 2020-01-01 RX ADMIN — MIDODRINE HYDROCHLORIDE 10 MG: 5 TABLET ORAL at 11:33

## 2020-01-01 RX ADMIN — SODIUM CHLORIDE, SODIUM LACTATE, CALCIUM CHLORIDE, MAGNESIUM CHLORIDE AND DEXTROSE 2000 ML: 1.5; 538; 448; 18.3; 5.08 INJECTION, SOLUTION INTRAPERITONEAL at 06:02

## 2020-01-01 RX ADMIN — MONTELUKAST SODIUM 10 MG: 10 TABLET, FILM COATED ORAL at 20:45

## 2020-01-01 RX ADMIN — MORPHINE SULFATE 4 MG: 10 SOLUTION ORAL at 09:10

## 2020-01-01 RX ADMIN — MIDODRINE HYDROCHLORIDE 10 MG: 5 TABLET ORAL at 16:29

## 2020-01-01 RX ADMIN — PREDNISONE 5 MG: 5 TABLET ORAL at 20:47

## 2020-01-01 RX ADMIN — PIPERACILLIN SODIUM AND TAZOBACTAM SODIUM 2.25 G: 2; .25 INJECTION, POWDER, LYOPHILIZED, FOR SOLUTION INTRAVENOUS at 18:51

## 2020-01-01 RX ADMIN — DEXTROSE MONOHYDRATE 25 G: 500 INJECTION PARENTERAL at 14:38

## 2020-01-01 RX ADMIN — FENTANYL CITRATE 50 MCG: 50 INJECTION, SOLUTION INTRAMUSCULAR; INTRAVENOUS at 15:22

## 2020-01-01 RX ADMIN — ONDANSETRON 4 MG: 2 INJECTION INTRAMUSCULAR; INTRAVENOUS at 16:36

## 2020-01-01 RX ADMIN — TACROLIMUS 0.5 MG: 1 CAPSULE ORAL at 09:23

## 2020-01-01 RX ADMIN — ASPIRIN 81 MG: 81 TABLET, CHEWABLE ORAL at 20:46

## 2020-01-01 RX ADMIN — ONDANSETRON 4 MG: 2 INJECTION INTRAMUSCULAR; INTRAVENOUS at 22:36

## 2020-01-01 RX ADMIN — Medication 10 ML: at 11:32

## 2020-01-01 ASSESSMENT — PAIN DESCRIPTION - LOCATION
LOCATION: BACK
LOCATION: GENERALIZED;BACK
LOCATION: GENERALIZED
LOCATION: BACK
LOCATION: GENERALIZED

## 2020-01-01 ASSESSMENT — PAIN SCALES - GENERAL
PAINLEVEL_OUTOF10: 9
PAINLEVEL_OUTOF10: 5
PAINLEVEL_OUTOF10: 10
PAINLEVEL_OUTOF10: 5
PAINLEVEL_OUTOF10: 4
PAINLEVEL_OUTOF10: 10
PAINLEVEL_OUTOF10: 0
PAINLEVEL_OUTOF10: 2
PAINLEVEL_OUTOF10: 10

## 2020-01-01 ASSESSMENT — PAIN DESCRIPTION - ONSET
ONSET: ON-GOING

## 2020-01-01 ASSESSMENT — ENCOUNTER SYMPTOMS
ABDOMINAL PAIN: 0
NAUSEA: 0
COUGH: 0
WHEEZING: 0
SHORTNESS OF BREATH: 1
DIARRHEA: 0
BACK PAIN: 0
ORTHOPNEA: 0
VOMITING: 0

## 2020-01-01 ASSESSMENT — PAIN - FUNCTIONAL ASSESSMENT: PAIN_FUNCTIONAL_ASSESSMENT: PREVENTS OR INTERFERES SOME ACTIVE ACTIVITIES AND ADLS

## 2020-01-01 ASSESSMENT — PAIN DESCRIPTION - FREQUENCY
FREQUENCY: CONTINUOUS

## 2020-02-05 PROBLEM — I95.9 HYPOTENSION: Status: ACTIVE | Noted: 2020-01-01

## 2020-02-05 NOTE — ED NOTES
Wife requesting to speak with Ralph Vergara,  before transporting to floor. Ralph Vergara notified and at bedside.       Vangie Chao RN  02/05/20 4838

## 2020-02-05 NOTE — CARE COORDINATION
Writer contacted [facility] and verified services , nurse will be out to pts home 2/6/20 per Marylou Jacques at [address]

## 2020-02-05 NOTE — CARE COORDINATION
Case Management Initial Discharge Plan  [place],             Met with:patient, pts wife to discuss discharge plans. Information verified: address, contacts, phone number, , insurance Yes  PCP: Shannan Clemens MD  Date of last visit: dec 2019    Insurance Provider: FILIPE< medicare    Discharge Planning    Living Arrangements:  Spouse/Significant Other, Children   Support Systems:  Children, Spouse/Significant Other, Family Members    Home has 1 stories  3 stairs to climb to get into front door, 0stairs to climb to reach second floor  Location of bedroom/bathroom in home main    Patient able to perform ADL's:Assisted has needed assist since this illness    Current Services (outpatient & in home) [address] home health was to start today, pt came to hospital , requests home 02  DME equipment: wheel chair  DME provider:       Potential Assistance Needed:  (home 02)    Patient agreeable to home care: Yes  Freedom of choice provided:  n/a    Prior SNF/Rehab Placement and Facility: [place] [id number]  Agreeable to SNF/Rehab: No  [place] of choice provided: n/a   Evaluation: no    Expected Discharge date:     Patient expects to be discharged to:  home  Follow Up Appointment: Best Day/ Time:      Transportation provider: Pts wife states he will need ambulance home as pt is unable to tolerate sitting due to desating  Transportation arrangements needed for discharge: Yes    Readmission Risk              Risk of Unplanned Readmission:        0             Does patient have a readmission risk score greater than 14?: not calculated  If yes, follow-up appointment must be made within 7 days of discharge. Goals of Care: To be able to breathe easier      Discharge Plan: Home with [address] home health care services as arranged by [address] [place] [address] for [facility] services , pt/ot.  Will arrrange for home 02          Electronically signed by Anusha Ball RN on 20 at 11:48 AM

## 2020-02-05 NOTE — H&P
INTENSIVE CARE UNIT  History and Physical       Patient's name:  Sharonda Birmingham Record Number: [id number]  Patient's account/billing number: [de-identified]  Patient's YOB: 1955  Age: 62 y.o. Date of Admission: 2/5/2020  9:46 AM  Reason of ICU admission:   Date of History and Physical Examination: 2/5/2020      Primary Care Physician: Margret Hunter MD  Attending Physician:    Code Status: Prior    Chief complaint:   SOB,   Reason for ICU admission:     Sob, hypotension. History Of Present Illness:   History was obtained from chart review and the patient and wife    Antonia Kyle is a 62 y.o. with past medical history of colon resection with reversal in 2014/2015 at [facility], ESRD on peritoneal dialysis, COPD status post lung transplant in 2011 and chronic immunosuppression on tacrolimus and prednisone. History of renal cell carcinoma on the left side diagnosed in 2017, history of chronic hypotension on midodrine 5 mg 3 times daily and Florinef 0.1 mg twice daily, history of left popliteal chronic DVT and suspicion for nonocclusive thrombus within the left portal vein on duplex liver ultrasound on January 30, 2020 not on anticoagulation because of his low platelet count. Patient was recently admitted to [facility] for uremic symptoms and also shortness of breath from where he was transferred to [facility] as his pulmonology and oncology are from [facility]. Patient had CT abdomen with contrast which is showing significant retroperitoneal lymphadenopathy status post biopsy which is pending. Patient was there at [facility] for 2 weeks getting treated for pneumonia right upper lobe with vancomycin and Zosyn discharge last Monday. Patient supposed to get home oxygen but due to insurance or some social issues patient did not get home oxygen. Patient became short of breath which brought him to the emergency department.   On arrival patient and her wife does not want to get admitted the patient to the hospital on day 1 is to get the oxygen and get discharged. But patient blood pressure at one point systolic running in 33N and 60s came up with midodrine. Patient family finally agreed for the admission as for ED physician request.  Critical care was consulted for possible sepsis because of hypotension, WBC of 15 and lactic acid of 5. Chest x-ray reviewed looks clear. Patient does not make urine. No belly pain, patient not complaining of fever, nausea, vomiting, chills, diaphoresis, night sweats. No cough, headache. Patient and his wife does not want heme-onc consultation, patient is having a pediatric cannula in the right upper extremity, patient does not want any form of central line including PICC line and okay to start pressors through peripheral IV. Discussed the cons/possible adverse effects of starting pressors from peripheral IV patient and family understood. Patient and family okay with intubation and chest compressions if such situation arises but does not want for prolonged period of time. Past Medical History:        Diagnosis Date    Accelerated rejection of lung transplant ([facility] [address]     Acute blood loss anemia     Acute postoperative respiratory insufficiency     Bronchiolitis obliterans syndrome (HCC)     Cancer (HCC)     COPD (chronic obstructive pulmonary disease) (HCC)     Diverticulitis     Hypoalbuminemia     Hypomagnesemia     Hypophosphatemia     Immunosuppression (HCC)     Intra-abdominal abscess (HCC)     Osteoporosis     Peritonitis ([address]     Vitamin D deficiency        Past Surgical History:        Procedure Laterality Date    LEG SURGERY      LUNG TRANSPLANT, DOUBLE      SMALL INTESTINE SURGERY         Allergies: Allergies   Allergen Reactions    Albuterol     Hydrocodone          Home Medications:   Prior to Admission medications    Medication Sig Start Date End Date Taking?  Authorizing Provider   famotidine (PEPCID) 20 MG tablet Take 20 mg by mouth once    Historical Provider, MD   tacrolimus (PROGRAF) 1 MG capsule Take 1.5 mg by mouth 2 times daily. Historical Provider, MD   magnesium oxide (MAG-OX) 400 MG tablet Take 400 mg by mouth 2 times daily. Historical Provider, MD   aspirin 81 MG tablet Take 81 mg by mouth daily. Historical Provider, MD   folic acid (FOLVITE) 1 MG tablet Take 1 mg by mouth daily. Historical Provider, MD   sulfamethoxazole-trimethoprim (BACTRIM;SEPTRA) 400-80 MG per tablet Take 1 tablet by mouth 2 times daily. Once Wed and Friday    Historical Provider, MD   predniSONE (DELTASONE) 5 MG tablet Take 5 mg by mouth. Historical Provider, MD   azithromycin (ZITHROMAX) 250 MG tablet Take 250 mg by mouth daily. Historical Provider, MD   omeprazole (PRILOSEC) 20 MG capsule Take 20 mg by mouth daily. Historical Provider, MD   Cholecalciferol (VITAMIN D3) 43051 UNITS CAPS Take  by mouth. Historical Provider, MD   Multiple Vitamins-Minerals (THERAPEUTIC MULTIVITAMIN-MINERALS) tablet Take 1 tablet by mouth. Historical Provider, MD   montelukast (SINGULAIR) 10 MG tablet Take 10 mg by mouth nightly. Historical Provider, MD   calcium carbonate (OSCAL) 500 MG TABS tablet Take 500 mg by mouth 2 times daily. Historical Provider, MD       Social History:   TOBACCO:   reports that he quit smoking about 10 years ago. His smoking use included cigarettes. He has a 45.00 pack-year smoking history. He has never used smokeless tobacco.  ETOH:   reports no history of alcohol use. DRUGS:  reports no history of drug use. OCCUPATION:      Family History:   History reviewed. No pertinent family history.         REVIEW OF SYSTEMS (ROS):  Review of Systems - Negative except mentioned in HPI   General ROS: negative  Psychological ROS: negative  Ophthalmic ROS: negative  ENT: negative  Hematological and Lymphatic ROS: Mentioned in HPI  Endocrine ROS: negative  Breast ROS: negative  Respiratory Patient Active Problem List   Diagnosis    Diverticulitis of intestine with perforation    H[facility]O lung transplant ([facility] [address]    Osteoporosis    Vitamin D deficiency    Accelerated rejection of lung transplant ([facility] [address]    Bronchiolitis obliterans syndrome ([facility] [address]    Edema    Sepsis ([facility] [address]    Sepsis ([facility] [address]    Peritonitis ([facility] [address]    Acute postoperative respiratory insufficiency    Immunosuppressed status ([facility] [address]    Acute blood loss anemia    Intra-abdominal abscess (HCC)    Hypoalbuminemia    Hypophosphatemia    Hypomagnesemia    Closed fracture of left distal radius    Pneumonia due to organism    Septic shock (HCC)    Aspiration pneumonia of right upper lobe (HCC)    Lactic acidosis    Acute respiratory failure with hypoxia (HCC)    Hypotension         Additional assessment:    Sepsis, likely source of infection unknown for now  Continue vancomycin and Zosyn -C2 dose vancomycin, renal dosing for Zosyn  Follow with blood cultures x2  We will consult infectious diseases, appreciate recommendations  trendWBC, trend lactic acid  No central line as per patient and family wishes please see HPI. Patient did receive 1 L of fluid bolus in the ICU  We will start the patient on 50 mL/h normal saline for the next 12 hours  Monitor vitals  Increase dose of midodrine 10 mg 3 times daily, continue home dose fludrocortisone 0.1 mg twice daily. chronic respiratory failure - history of lung transplantation  Patient supposed to get home oxygen  Consult  for the arrangement will repeat home O2 evaluation tomorrow to see if patient qualifies.   Supplemental O2 inpatient, BiPAP as needed  Discussed with ED nurse to update the patient's home medications  Will call transplant coordinator at [facility] for Prograf dose  resume prednisone 5 mg daily  ESRD on peritoneal dialysis, consult nephrology for management of PD  Chronic lower back pain -morphine 4 mg p.o. twice daily as needed    Prophylaxis Trini subcu 3 times daily, hold if platelets less than 75,989  GI prophylaxis Pepcid daily  Discharge planning consulted              Zakiya Munguia MD      Department of Internal Medicine  [address]         2/5/2020, 4:47 PM  Attending Physician Statement  I have discussed the care of Saji Webster, including pertinent history and exam findings,  with the resident. I have seen and examined the patient and the key elements of all parts of the encounter have been performed by me. I agree with the assessment, plan and orders as documented by the resident with additions . D/w pt's wife   doesnot want central line or picc line   If needed start pressors via peripheral iv as per wife and pt   Increase midodrine       Total critical care time caring for this patient with life threatening, unstable organ failure, including direct patient contact, management of life support systems, review of data including imaging and labs, discussions with other team members and physicians at least 27   Min so far today, excluding procedures. Treatment plan Discussed with nursing staff in detail , all questions answered . Electronically signed by Lanette Schmidt MD on   2/5/20 at 10:18 PM    Please note that this chart was generated using voice recognition Dragon dictation software. Although every effort was made to ensure the accuracy of this automated transcription, some errors in transcription may have occurred.

## 2020-02-05 NOTE — ED NOTES
Pt and wife would like time to talk over blood cultures and another IV attempt, Dr. Isaías Waite notified.      Kahlil Kamara RN  02/05/20 [id number]

## 2020-02-05 NOTE — ED NOTES
Dr. William Hussein with Critical Care at bedside evaluating patient.       Robert Hebert RN  02/05/20 [id number]

## 2020-02-05 NOTE — CARE COORDINATION
Lakeshia Assist Manager on floor at [facility] that pt stayed [phone].  Writer informed by Idris Broussard at [facility] that pt could not complete home 02 eval

## 2020-02-05 NOTE — ED PROVIDER NOTES
[facility] ED  EMERGENCY DEPARTMENT ENCOUNTER      Pt Name: Salvatore Lee  MRN: [id number]  Sagegftrinh 1955  Date of evaluation: 2/5/20  PCP:  Josie Nissen, MD    CHIEF COMPLAINT:   Chief Complaint   Patient presents with    Respiratory Distress     pt w/ hx of double lung transplant in 2011 was discharged from [facility] on Monday for pneumonia and Home O2 was supposed to be set up however wife states she has not received any follow up and pt still does not have home O2     HISTORY OF PRESENT ILLNESS   Salvatore Lee is a 62 y.o. male whopresents with with shortness of breath. Patient has a history of a double lung transplant in 2011 at [facility] clinic. He is at immunosuppressive drugs. He was recently admitted there and discharged 2 days ago for what was believed to be a pneumonia. The cultures were negative. He is also found to have metastasis of his renal cell carcinoma. Also found to have a lower extremity DVT was not started on anticoagulation due to high risk of bleeding due to his complex medical history. He was discharged home with his wife and there was a mixup with the insurance and he supposed to be on home O2 but did not receive it. He is also on peritoneal dialysis. He denies chest pain or cough. Is no headache or sore throat. REVIEW OF SYSTEMS       Review of Systems   Constitutional: Negative for chills and fever. HENT: Negative for nosebleeds. Respiratory: Positive for shortness of breath. Negative for cough and wheezing. Cardiovascular: Negative for chest pain, palpitations and orthopnea. Gastrointestinal: Negative for abdominal pain, diarrhea, nausea and vomiting. Genitourinary: Negative for dysuria and urgency. Musculoskeletal: Negative for back pain and neck pain. Skin: Negative for rash. Neurological: Negative for dizziness, loss of consciousness and headaches. 10 essential systems negative except as stated above and in the HPI. PAST MEDICAL HISTORY   PMH:  has a past medical history of Accelerated rejection of lung transplant ([facility] [address] Acute blood loss anemia, Acute postoperative respiratory insufficiency, Bronchiolitis obliterans syndrome ([facility] [address] Cancer ([facility] [address] COPD (chronic obstructive pulmonary disease) ([facility] [address] Diverticulitis, Hypoalbuminemia, Hypomagnesemia, Hypophosphatemia, Immunosuppression ([facility] [address] Intra-abdominal abscess ([facility] [address] Osteoporosis, Peritonitis ([facility] [address] and Vitamin D deficiency. SurgicalHistory:  has a past surgical history that includes Lung transplant, double; Leg Surgery; and Small intestine surgery. Social History:  reports that he quit smoking about 10 years ago. His smoking use included cigarettes. He has a 45.00 pack-year smoking history. He has never used smokeless tobacco. He reports that he does not drink alcohol or use drugs. Family History: Noncontributory at this time  Psychiatric History: Noncontributory at this time    Allergies:is allergic to albuterol and hydrocodone. PHYSICAL EXAM     INITIAL VITALS: BP 82/60   Pulse 97   Temp 97.6 °F (36.4 °C) (Oral)   Resp 12   Ht 5' 6\" (1.676 m)   Wt 97 lb (44 kg)   SpO2 96%   BMI 15.66 kg/m²      Physical Exam  Constitutional:       General: He is not in acute distress. Appearance: He is well-developed. He is not diaphoretic. HENT:      Head: Normocephalic and atraumatic. Eyes:      General: No scleral icterus. Right eye: No discharge. Left eye: No discharge. Conjunctiva/sclera: Conjunctivae normal.      Pupils: Pupils are equal, round, and reactive to light. Neck:      Musculoskeletal: Normal range of motion. Cardiovascular:      Rate and Rhythm: Normal rate and regular rhythm. Heart sounds: Normal heart sounds. No murmur. No friction rub. No gallop. Pulmonary:      Effort: Pulmonary effort is normal. No respiratory distress. Breath sounds: Normal breath sounds. No wheezing or rales.    Abdominal:      General: There is no distension. Palpations: Abdomen is soft. There is no mass. Tenderness: There is no abdominal tenderness. There is no guarding or rebound. Hernia: No hernia is present. Musculoskeletal: Normal range of motion. Skin:     General: Skin is warm. Findings: No rash. Neurological:      Mental Status: He is alert and oriented to person, place, and time.    Psychiatric:         Behavior: Behavior normal.           EMERGENCY DEPARTMENT COURSE:     LABS: Labs reviewed by myselfshow:   Labs Reviewed   CBC WITH AUTO DIFFERENTIAL - Abnormal; Notable for the following components:       Result Value    WBC 15.4 (*)     RBC 4.06 (*)     Hemoglobin 12.0 (*)     RDW 21.2 (*)     NRBC Automated 0.7 (*)     Seg Neutrophils 77 (*)     Lymphocytes 20 (*)     Segs Absolute 11.86 (*)     All other components within normal limits   BASIC METABOLIC PANEL W/ REFLEX TO MG FOR LOW K - Abnormal; Notable for the following components:    Glucose 68 (*)     BUN 33 (*)     CREATININE 9.52 (*)     Calcium 7.1 (*)     Sodium 134 (*)     Potassium 3.6 (*)     Chloride 96 (*)     Anion Gap 18 (*)     GFR Non- 6 (*)     GFR  7 (*)     All other components within normal limits   BRAIN NATRIURETIC PEPTIDE - Abnormal; Notable for the following components:    Pro-BNP 22,244 (*)     All other components within normal limits   TROPONIN - Abnormal; Notable for the following components:    Troponin, High Sensitivity 101 (*)     All other components within normal limits   LACTIC ACID, PLASMA - Abnormal; Notable for the following components:    Lactic Acid, Whole Blood 5.1 (*)     All other components within normal limits   PROTIME-INR - Abnormal; Notable for the following components:    Protime 14.0 (*)     All other components within normal limits   IMMATURE PLATELET FRACTION - Abnormal; Notable for the following components:    Platelet, Immature Fraction 12.4 (*)     Platelet, Fluorescence 62 (*)     All other components within normal limits   CULTURE BLOOD #1   CULTURE BLOOD #1   PREVIOUS SPECIMEN          EKG: All EKG's are interpreted by the Emergency Department Physician who either signs or Co-signs this chart inthe absence of a cardiologist.      RADIOLOGY:  I directly visualized the following  images and reviewed the radiologist interpretations:  XR CHEST PORTABLE   Final Result   Right upper lobe pneumonia seen on the previous exam has nearly completely   resolved with minimal residual atelectasis/scarring. Continued radiographic   follow-up recommended to ensure complete clearance. CONSULTS:  IP CONSULT TO SOCIAL WORK  PHARMACY TO DOSE VANCOMYCIN  IP CONSULT TO CRITICAL CARE  IP CONSULT TO IV TEAM    MDM:    Off oxygen patient is hypoxic in the high 70s low 80s. On oxygen his sats go up to 99. Patient would benefit from home O2 given the above. Discussed lab work with patient. I feel he benefit from inpatient therapy his white count is elevated, he is on steroids is chronically elevated but due to his complex history given the history is lung bypass, recent admission new cancer diagnosis and hypotension here I did recommend admission. He states he is always hypotensive stocks in the 70s but in light of his recent complex admission I did recommendation for observation. Patient refused. He understands why going home he is putting self at risk for death, disability, delayed diagnosis, financial loss, emotional turmoil prolonged hospitalization    He has Decision-making capacity. He is awake alert and oriented x3. He Could  verbalize concerns back to me    2:30 PM    Persistently hypotensive here with systolics in the 71B and 69J he is non-tachycardic and he is awake alert. Given his immunosuppressive state concern for possible sepsis especially given his complex recent admission to [facility] clinic. Will admit to the ICU. Discussed case with eICU agreed to admit. Patient given broad-spectrum antibiotics he was given general fluid resuscitation here given his dialysis history and major Kingsley Plew proved his blood pressure systolics in the 47V    FINAL IMPRESSION:     1. Shortness of breath          DISPOSITION:  DISPOSITION          PATIENT REFERRED TO:  Young Parekh MD  [facility] [address]  [place] [facility] [id number]  [phone]      Follow-up appointment soon as possible    [facility] ED  [address]    If symptoms worsen      DISCHARGEMEDICATIONS:  New Prescriptions    No medications on file       (Please note that portions of this note were completed with a voice recognition program.  Efforts were made to edit thedictations but occasionally words are mis-transcribed. )    Abhay Bland MD Attending Emergency Medicine Physician          Amor Mata MD  02/05/20 5135      Patient was evaluated today for the diagnosis of COPD. I entered a DME order for home oxygen because the diagnosis and testing requires the patient to have supplemental oxygen. Condition will improve or be benefited by oxygen use. The patient is  able to perform good mobility in a home setting and therefore does require the use of a portable oxygen system. The need for this equipment was discussed with the patient and he understands and is in agreement.      Amor Mata MD  02/05/20 [address] [place] [address] Maria D Mcfadden MD  02/05/20 5503

## 2020-02-05 NOTE — CARE COORDINATION
Writer informed by [address] [facility] [address] [facility] [address] that pt will have to have another home 02 eval completed because it was completed in er and pt therefore unstable. Pt states he had home 02 eval done at [facility] also, writer contacted Lakeshia  (floor pt was on ) at [facility] , informs writer that pt was unable to complete home o2 eval but she did fax what she had.  Contact [phone] with any questions

## 2020-02-05 NOTE — CONSULTS
from the [facility] where he had spent 2 weeks for treatment of a RUL pneumonia with zosyn and vancomycin. He was off antibiotics at the time of discharge on 2-3-20,    He was supposed to be on home 02 but some issue prevented him from getting the home 02. He therefore went to the ER where 02 was initiated. In order for him to qualify for home 02 he has to be admitted to the hospital for at least one day. In the ER he was found to be hypotensive with systolics of 50 to 60 mm Hg. He indicates he has chronic hypotension for which he receives midodrine. He was also found to have leucocytosis with WBC of 15 and lactic acidosis. The patient had a double lung transplant in 2011 for which he receives tacrolimus and prednisone. He also had a Lt renal cell Ca on 2017. Has ESRD for which he receives peritoneal dialysis. He recently had a Ct of the abdomen which showed retriperitoneal adenopathy. A biopsy was done with results pending. In the ICU the patient is comfortable, on nasal 02. He is alert, cogent. Looks chronically ill. The CXR shows resolution of the prior RUL pneumonia. Labs, X rays reviewed: 2/5/2020    BUN:33  Cr:9.52  Pro BNP 22,244    WBC: 15.4  Hb: 12.0  Plat: 124    Cultures:  Urine:  ·   Blood:  ·   Sputum :  ·   Wound:  ·     Discussed with patient, RN, family, CC    I have personally reviewed the past medical history, past surgical history, medications, social history, and family history, and I have updated the database accordingly.   Past Medical History:     Past Medical History:   Diagnosis Date    Accelerated rejection of lung transplant ([facility] [address]     Acute blood loss anemia     Acute postoperative respiratory insufficiency     Bronchiolitis obliterans syndrome (HCC)     Cancer (HCC)     COPD (chronic obstructive pulmonary disease) (HCC)     Diverticulitis     Hypoalbuminemia     Hypomagnesemia     Hypophosphatemia     Immunosuppression (HCC)     Intra-abdominal abscess

## 2020-02-06 PROBLEM — J96.90 RESPIRATORY FAILURE (HCC): Status: ACTIVE | Noted: 2020-01-01

## 2020-02-06 NOTE — PROGRESS NOTES
Physical Therapy    Facility/Department: [address] MICU  Initial Assessment    NAME: Adilene Pradhan  : 1955  MRN: [id number]    Date of Service: 2020  Chief complaint:   SOB,   Reason for ICU admission:      Sob, hypotension. History Of Present Illness:   History was obtained from chart review and the patient and wife     Adilene Pradhan is a 62 y.o. with past medical history of colon resection with reversal in / at [facility], ESRD on peritoneal dialysis, COPD status post lung transplant in  and chronic immunosuppression on tacrolimus and prednisone. History of renal cell carcinoma on the left side diagnosed in , history of chronic hypotension on midodrine 5 mg 3 times daily and Florinef 0.1 mg twice daily, history of left popliteal chronic DVT and suspicion for nonocclusive thrombus within the left portal vein on duplex liver ultrasound on 2020 not on anticoagulation because of his low platelet count. Patient was recently admitted to [facility] for uremic symptoms and also shortness of breath from where he was transferred to [facility] as his pulmonology and oncology are from [facility]. Patient had CT abdomen with contrast which is showing significant retroperitoneal lymphadenopathy status post biopsy which is pending. Patient was there at [facility] for 2 weeks getting treated for pneumonia right upper lobe with vancomycin and Zosyn discharge last Monday. Patient supposed to get home oxygen but due to insurance or some social issues patient did not get home oxygen. Patient became short of breath which brought him to the emergency department. On arrival patient and her wife does not want to get admitted the patient to the hospital on day 1 is to get the oxygen and get discharged. But patient blood pressure at one point systolic running in 07A and 60s came up with midodrine.   Patient family finally agreed for the admission as for ED physician request.  Critical care was consulted for possible sepsis because of hypotension, WBC of 15 and lactic acid of 5. Chest x-ray reviewed looks clear. Patient does not make urine. No belly pain, patient not complaining of fever, nausea, vomiting, chills, diaphoresis, night sweats. No cough, headache.     Patient and his wife does not want heme-onc consultation, patient is having a pediatric cannula in the right upper extremity, patient does not want any form of central line including PICC line and okay to start pressors through peripheral IV. Discussed the cons/possible adverse effects of starting pressors from peripheral IV patient and family understood.     Patient and family okay with intubation and chest compressions if such situation arises but does not want for prolonged period of time. Discharge Recommendations:  Patient would benefit from continued therapy after discharge   PT Equipment Recommendations  Equipment Needed: No    Assessment   Body structures, Functions, Activity limitations: Decreased functional mobility ; Decreased posture;Decreased endurance;Decreased strength;Decreased balance;Decreased safe awareness  Assessment: Pt performed supine to sit with moderate assistance x2. Pt sat EOB with good sitting balance. Therapy session limited by pt reports of increased dizziness. Pt would benefit from continued acute care physical therapy to improve strength, balance, and endurance. Prognosis: Fair  Decision Making: Medium Complexity  PT Education: Goals;PT Role;General Safety;Plan of Care;Transfer Training;Functional Mobility Training  REQUIRES PT FOLLOW UP: Yes  Activity Tolerance  Activity Tolerance: Patient limited by dizziness and nausea       Patient Diagnosis(es): The primary encounter diagnosis was Shortness of breath. Diagnoses of Chronic respiratory failure with hypoxia ([facility] and H/O lung transplant ([facility] [address] were also pertinent to this visit.      has a past medical history of Responsibility: Secondary  Laundry Responsibility: Secondary  Cleaning Responsibility: Secondary  Shopping Responsibility: Secondary  Ambulation Assistance: Needs assistance(use of RW sometimes)  Transfer Assistance: Independent  Active : Yes  Mode of Transportation: (Wife has been driving recently since sickness)  Occupation: Retired  Type of occupation: [employer]  Leisure & Hobbies: working on cars  Cognition   Cognition  Overall Cognitive Status: [facility]    Objective     Observation/Palpation  Posture: Fair    AROM RLE (degrees)  RLE AROM: WFL  AROM LLE (degrees)  LLE AROM : WFL  AROM RUE (degrees)  RUE AROM : WFL  AROM LUE (degrees)  LUE AROM : WFL  Strength RLE  Strength RLE: WFL  Comment: Grossly tested: 4/5 based on bed mobilty   Strength LLE  Strength LLE: WFL  Comment: Grossly tested: 4/5 based on bed mobility   Strength RUE  Strength RUE: WFL  Comment: Grossly 4/5 based on bed mobility   Strength LUE  Strength LUE: WFL  Comment: Grossly 4/5 based on bed mobility   Motor Control  Gross Motor?: WFL  Sensation  Overall Sensation Status: WFL  Bed mobility  Supine to Sit: Moderate assistance;2 Person assistance  Sit to Supine: Minimal assistance;2 Person assistance  Scooting: Contact guard assistance  Comment: Bed mobility performed with HOB elevated. Pt performed supine to sit twice. Pt reported dizziness when sitting EOB the first time and requested to return to supine. Pt reported dizziness when sitting EOB the second time that did not decrease with increased time; SPO2 dropped from low 90's on room air to 86% on room air when sitting EOB. Pt reported nausea and was returned to lying supine. Respiratory therapist present in room and placed pt on SPO2 in high 90's on 2L O2 via NC after mobility. Transfers  Comment: Did not attempt transfers on this date due to pt dizziness, nausea, and pt requesting to return to supine in bed.    Ambulation  Ambulation?: No  Stairs/Curb  Stairs?: No     Balance  Posture:

## 2020-02-06 NOTE — PROGRESS NOTES
[facility] - Progress Note    Today's Date and Time: 2/6/2020, 10:51 AM    Impression :   · Chronic hypotension, shock  · Lactic acidosis  · Double lung transplant  · Renal cell Ca with metastasis  · Recent treatment for pneumonia  · Hypoxic respiratory failure  · ESRD on Peritoneal dialysis  · Elevated troponin levels    Recommendations:   · Discontinue zosyn   · D/C vancomycin  · Supportive treatment for BP and respiratory failure    Medical Decision Making/Summary/Discussion:2/6/2020     · Patient with prior double lung transplant   · On tacrolimus and prednisone  · Renal cell Ca with metastasis  · Recent 2 week admission at [facility] because of RUL pneumonia  · RUL pneumonia resolved on 2-5-20 CXR  · Admitted with hypoxic respiratory failure after not receiving home 02  · Hx of chronic hypotension. On midodrine and florinef  · Hypotension and lactic acidosis during current admission  · ESRD on PD  · Elevated troponins   · Clinically no apparent infection to account for hypotension. ·  Will d/c antibiotics  ·   Infection Control Recommendations   · [place] Precautions  · Contact Isolation     Antimicrobial Stewardship Recommendations     · Simplification of therapy  · PK dosing  Coordination of Outpatient Care:   · Estimated Length of IV antimicrobials: TBD  · Patient will need Midline Catheter Insertion: No  · Patient will need PICC line Insertion:No  · Patient will need: Home IV , [place],  SNF,  LTAC:No  · Patient will need outpatient wound care:No    Chief complaint/reason for consultation:   · Shock  · Lung transplant      History of Present Illness:   Guadalupe Hilliard is a 62y.o.-year-old  male who was initially admitted on 2/5/2020. Patient seen at the request of Bolivar Costello. INITIAL HISTORY:  Patient presented through ER with complaints of SOB.  He was recently released from the [facility] where he had spent 2 weeks for treatment of a RUL pneumonia with zosyn and vancomycin. He was off antibiotics at the time of discharge on 2-3-20,    He was supposed to be on home 02 but some issue prevented him from getting the home 02. He therefore went to the ER where 02 was initiated. In order for him to qualify for home 02 he has to be admitted to the hospital for at least one day. In the ER he was found to be hypotensive with systolics of 50 to 60 mm Hg. He indicates he has chronic hypotension for which he receives midodrine. He was also found to have leucocytosis with WBC of 15 and lactic acidosis. The patient had a double lung transplant in 2011 for which he receives tacrolimus and prednisone. He also had a Lt renal cell Ca on 2017. Has ESRD for which he receives peritoneal dialysis. He recently had a Ct of the abdomen which showed retriperitoneal adenopathy. A biopsy was done with results pending. In the ICU the patient is comfortable, on nasal 02. He is alert, cogent. Looks chronically ill. The CXR shows resolution of the prior RUL pneumonia. CURRENT EVALUATION : 2/6/2020    Afebrile  VS show persistent hypotension despite midodrine and florinef. Patient remains cogent and comfortable. Simply weak and unable to sit up. Has been approved for home 02  MRSA probe -negative  Will D/C antibiotics. Labs, X rays reviewed: 2/6/2020    BUN:33  Cr:9.52  Pro BNP 22,244    WBC: 15.4  Hb: 12.0  Plat: 124    Cultures:  Urine:  ·   Blood:  ·   Sputum :  ·   Wound:  ·   MRSA probe- Negative    Discussed with patient, RN, family, CC    I have personally reviewed the past medical history, past surgical history, medications, social history, and family history, and I have updated the database accordingly.   Past Medical History:     Past Medical History:   Diagnosis Date    Accelerated rejection of lung transplant ([facility] [address]     Acute blood loss anemia     Acute postoperative respiratory insufficiency     Bronchiolitis obliterans syndrome (HCC)     02/06/20 0300 (!) 79/55 -- -- 89 13 99 %     General Appearance: Awake, alert, chronically ill  Head:  Normocephalic, no trauma  Eyes: Pupils equal, round, reactive to light and accommodation; extraocular movements intact; sclera anicteric; conjunctivae pink. No embolic phenomena. ENT: Oropharynx clear, without erythema, exudate, or thrush. No tenderness of sinuses. Mouth/throat: mucosa pink and moist. No lesions. Dentition in good repair. Neck:Supple, without lymphadenopathy. Thyroid normal, No bruits. Pulmonary/Chest: Clear to auscultation, without wheezes, rales, or rhonchi. No dullness to percussion. Cardiovascular: Regular rate and rhythm without murmurs, rubs, or gallops. Abdomen: Soft, non tender. Bowel sounds normal. No organomegaly  All four Extremities: No cyanosis, clubbing, edema, or effusions. Neurologic: No gross sensory or motor deficits. Skin: Warm and dry with good turgor. Signs of peripheral arterial insufficiency. No ulcerations. No open wounds. Medical Decision Making -Laboratory:   I have independently reviewed/ordered the following labs:    CBC with Differential:   Recent Labs     02/05/20  1151   WBC 15.4*   HGB 12.0*   HCT 40.8   PLT See Reflexed IPF Result   LYMPHOPCT 20*   MONOPCT 2     BMP:   Recent Labs     02/05/20  1151 02/05/20  1831 02/05/20  2124   *  --   --    K 3.6*  --   --    CL 96*  --   --    CO2 20  --   --    BUN 33*  --   --    CREATININE 9.52*  --  8.29*   MG  --  1.5*  --      Hepatic Function Panel: No results for input(s): PROT, LABALBU, BILIDIR, IBILI, BILITOT, ALKPHOS, ALT, AST in the last 72 hours. No results for input(s): RPR in the last 72 hours. No results for input(s): HIV in the last 72 hours. No results for input(s): BC in the last 72 hours.   Lab Results   Component Value Date    RBC 4.06 02/05/2020    WBC 15.4 02/05/2020    TURBIDITY CLEAR 12/04/2014     Lab Results   Component Value Date    CREATININE 8.29 02/05/2020    CREATININE 9.52

## 2020-02-06 NOTE — PROGRESS NOTES
INTENSIVE CARE UNIT  Resident Physician Progress Note    Patient - Angie Mendoza  Date of Admission -  2020  9:46 AM  Date of Evaluation -  2020  Room and Bed Number -  0126/0126-01   Hospital Day - 1    Cc- chronic hypotension   SUBJECTIVE:     OVERNIGHT EVENTS:    Patient seen and examined at bedside. Alert and oriented. Follows commands. Blood pressure has improved with a MAP > 65. The patient has received a total of 1.75 liters of fluid so far with 750 ml given since last evening. Refusing to get the lab draws done. Says he is tired of poking. Not on any pressors or sedation.   He is on 2L of NC  Hypotension cause multifactorial due to hypovolemia and autonomic dysfunction  Patient feels ready to go home    AWAKE & FOLLOWING COMMANDS:  [] No   [x] Yes    SECRETIONS Amount:  [] Small [] Moderate  [] Large  [x] None  Color:     [] White [] Colored  [] Bloody    SEDATION:  RAAS Score:  [] Propofol gtt  [] Versed gtt  [] Ativan gtt   [x] No Sedation    PARALYZED:  [x] No    [] Yes    VASOPRESSORS:  [x] No    [] Yes  [] Levophed [] Dopamine [] Vasopressin  [] Dobutamine [] Phenylephrine [] Epinephrine      OBJECTIVE:     VITAL SIGNS:  BP 92/63   Pulse 88   Temp 97.9 °F (36.6 °C) (Axillary)   Resp 11   Ht 5' 6\" (1.676 m)   Wt 99 lb 3.3 oz (45 kg)   SpO2 99%   BMI 16.01 kg/m²   Tmax over 24 hours:  Temp (24hrs), Av.6 °F (36.4 °C), Min:97.2 °F (36.2 °C), Max:97.9 °F (36.6 °C)      Patient Vitals for the past 8 hrs:   BP Temp Temp src Pulse Resp SpO2   20 0500 92/63 -- -- 88 11 99 %   20 0400 (!) 84/59 97.9 °F (36.6 °C) Axillary 87 15 100 %   20 0300 (!) 79/55 -- -- 89 13 99 %   20 0200 (!) 68/51 -- -- 88 13 98 %   20 0100 (!) 76/53 -- -- 93 11 100 %   20 0000 81/61 97.7 °F (36.5 °C) Axillary 94 14 100 %   20 2300 (!) 69/53 -- -- 104 23 98 %   20 2200 (!) 75/50 -- -- 104 14 100 %         Intake/Output Summary (Last 24 hours) at 2020 0535  Last of Saji Webster, including pertinent history and exam findings,  with the resident. I have seen and examined the patient and the key elements of all parts of the encounter have been performed by me. I agree with the assessment, plan and orders as documented by the resident with additions . Wants to go home   Will dc as per pt and his wife's request       Treatment plan Discussed with nursing staff in detail , all questions answered . Electronically signed by Lloyd Vallejo MD on   2/6/20 at 8:33 PM    Please note that this chart was generated using voice recognition Dragon dictation software. Although every effort was made to ensure the accuracy of this automated transcription, some errors in transcription may have occurred.

## 2020-02-06 NOTE — CONSULTS
Palliative Care Inpatient Consult    NAME:  Josse Schneider [facility]  MEDICAL RECORD NUMBER:  [id number]  AGE: 62 y.o. GENDER: male  : 1955  TODAY'S DATE:  2020    Reasons for Consultation:    Symptom and/or pain management  Provision of information regarding PC and/or hospice philosophies  Complex, time-intensive communication and interdisciplinary psychosocial support  Clarification of goals of care and/or assistance with difficult decision-making  Guidance in regards to resources and transition(s)    Members of PC team contributing to this consultation are :  Dr. Lyssa Collins palliative care attending  History of Present Illness     The patient is a 62 y.o. Non-/non  male who presents with Respiratory Distress (pt w/ hx of double lung transplant in  was discharged from [place] on Monday for pneumonia and Home O2 was supposed to be set up however wife states she has not received any follow up and pt still does not have home O2)      Referred to Palliative Care by   [x] Physician   [] Nursing  [] Family Request   [] Other:       He was admitted to the critical care service for Hypotension [I95.9]. His hospital course has been associated with <principal problem not specified>. The patient has a complicated medical history and has been hospitalized since 2020  9:46 AM.  The patient presented to the hospital for home oxygen evaluation and was admitted due to hypotension. The patient was recently discharged from [place] clinic for the treatment of RUL pneumonia. Patient has history of double lung transplant in . He also had left renal cell cancer diagnosed in 2017 and receives peritoneal dialysis for ESRD. Patient's recent CT abdomen showed retroperitoneal adenopathy and biopsy results awaited.   Palliative care consulted for goals of care, CODE STATUS discussion and family support    Active Hospital Problems    Diagnosis Date Noted    Hypotension [I95.9] 2020    Shortness of Death      Plan      Palliative Interaction:  The patient was seen today along with medical student, patient was lying comfortably, alert, awake and following commands    Patient's wife Darius Leigh and other family members were present in patient's room    I discussed patient's current medical conditions with him and both the patient and Darius Leigh seemed to understand that patient has cancer with metastasis    I asked the patient what his goals were and he is stated that his goal is to gain strength and get better and wants to stay at home    Darius Leigh also stated that she wants to fulfill her 's wish and would want him to stay home and not go to any nursing home or any other inpatient facility    I discussed with the patient and Darius Leigh regarding hospice care as well as home palliative care and Darius Leigh told that they have [facility] home health care and thus would want to have their palliative care    I encouraged the patient and Darius Leigh to have [facility] palliative care along with their home health care    I explained the significance of POA paperwork for health to the patient and told him that in case he is not able to make decisions regarding his health then his wife Darius Leigh will be his spokesperson and he acknowledged understanding of it    I explained the different types of codes to the patient and he stated that he does not want to get CPR/chest compressions to be done as if his heart stopped and also does not want to be intubated if his breathing stopped but was not ready to change his CODE STATUS and I told him that he can discuss this with Dariusdominique Leigh and further discussion can be done again and he agreed with it    I offered comfort and emotional support to the patient and family      Education/support to staff  Education/support to family  Education/support to patient  Discharge planning/helping to coordinate care  Communications with primary service  Caregiver support/education  Code status clarified: Full Code  Code status clarified: [facility]  Code status clarified: DNRCCA  Other major issues     Principle Problem/Diagnosis:  SOB    Additional Assessments:   Active Problems:    Hypotension    Shortness of breath    Hypovolemic shock (HCC)    1- Symptom management/ pain control     Pain Assessment:  Pain is controlled with current analgesics. Medication(s) being used: narcotic analgesics including morphine. Anxiety:  fatigue                          Dyspnea:  acute dyspnea and chronic dyspnea                          Fatigue:  Tiredness and weakness    We feel the patient symptoms are being controlled. his current regimen is reviewed by myself and discussed with the staff. We will follow-up with the patient for further CODE STATUS discussions    2- Goals of care evaluation   The patient goals of care are improve or maintain function/quality of life, spiritual needs, remain at home, strengthening relationships, preserve independence/autonomy/control and support for family/caregiver   Goals of care discussed with:    [] Patient independently    [x] Patient and Family    [] Family or Healthcare DPOA independently    [] Unable to discuss with patient, family/DPOA not present    3- Code Status  Full Code    4- Other recommendations   - We will continue to provide comfort and support to the patient and the family  Please call with any palliative questions or concerns. Palliative Care Team is available via perfect serve or via phone. Palliative Care will continue to follow Mr. Laquita Aguayo care as needed. Thank you for allowing Palliative Care to participate in the care of Mr. Stephanie Shin . This note has been dictated by dragon, typing errors may be a possibility.     The total time I spent in seeing the patient, discussing goals of care, advanced directives, code status and other major issues was more than 60 minutes      Electronically signed by   Marylu Small MD  Palliative Care Team  on 2/6/2020 at 9:06

## 2020-02-06 NOTE — PROGRESS NOTES
Occupational Therapy   Occupational Therapy Initial Assessment  Date: 2020   Patient Name: Ha Blackwell  MRN: [id number]     : 1955    Date of Service: 2020    Discharge Recommendations:    Further therapy recommended at discharge. Assessment   Performance deficits / Impairments: Decreased high-level IADLs;Decreased functional mobility ; Decreased ADL status; Decreased endurance;Decreased coordination;Decreased strength;Decreased balance;Decreased safe awareness  Assessment: Patient is expected to need acute OT services at this time to address functional deficits that are impacting ADL/IADL and functional transfer/mobility performance and safety to return to OF and reduce risk for further ADL decline. Prognosis: Fair  Decision Making: Medium Complexity  Patient Education: purpose of evaluation, OT POC, ways to address dizziness, importance of OOB activity - good return   REQUIRES OT FOLLOW UP: Yes  Activity Tolerance  Activity Tolerance: Patient limited by fatigue  Safety Devices  Safety Devices in place: Yes  Type of devices: Nurse notified; Left in bed; All fall risk precautions in place; Patient at risk for falls;Call light within reach  Restraints  Initially in place: No           Patient Diagnosis(es): The primary encounter diagnosis was Shortness of breath. Diagnoses of Chronic respiratory failure with hypoxia ([address] and H/O lung transplant ([facility] [address] were also pertinent to this visit. has a past medical history of Accelerated rejection of lung transplant ([address] Acute blood loss anemia, Acute postoperative respiratory insufficiency, Bronchiolitis obliterans syndrome ([address] Cancer ([address] COPD (chronic obstructive pulmonary disease) ([address] Diverticulitis, Hypoalbuminemia, Hypomagnesemia, Hypophosphatemia, Immunosuppression ([address] Intra-abdominal abscess ([address] Osteoporosis, Peritonitis ([address] and Vitamin D deficiency.    has a past surgical history that includes Lung transplant, double; Leg

## 2020-02-06 NOTE — PLAN OF CARE
Problem: Fluid Volume - Imbalance:  Goal: Absence of imbalanced fluid volume signs and symptoms  Description  Absence of imbalanced fluid volume signs and symptoms  2/6/2020 0537 by Cresencio Sykes RN  Outcome: Ongoing  2/5/2020 2020 by Braeden Blunt RN  Outcome: Ongoing     Problem: Pain:  Goal: Pain level will decrease  Description  Pain level will decrease  2/6/2020 0537 by Cresencio Sykes RN  Outcome: Ongoing  2/5/2020 2020 by Braeden Blunt RN  Outcome: Ongoing  Goal: Recognizes and communicates pain  Description  Recognizes and communicates pain  2/6/2020  by Cresencio Sykes RN  Outcome: Ongoing  2/5/2020 2020 by Braeden Blunt RN  Outcome: Ongoing  Goal: Control of acute pain  Description  Control of acute pain  2/6/2020 0537 by Cresencio Sykes RN  Outcome: Ongoing  2/5/2020 2020 by Braeden Blunt RN  Outcome: Ongoing  Goal: Control of chronic pain  Description  Control of chronic pain  2/6/2020 0537 by Cresencio Sykes RN  Outcome: Ongoing  2/5/2020 2020 by Braeden Blunt RN  Outcome: Ongoing     Problem: Tissue Perfusion - Cardiopulmonary, Altered:  Goal: Absence of angina  Description  Absence of angina  2/6/2020 0537 by Cresencio Sykes RN  Outcome: Ongoing  2/5/2020 2020 by Braeden Blunt RN  Outcome: Ongoing  Goal: Hemodynamic stability will improve  Description  Hemodynamic stability will improve  2/6/2020 0537 by Cresencio Sykes RN  Outcome: Ongoing  2/5/2020 2020 by Braeden Blunt RN  Outcome: Ongoing     Problem: Discharge Planning:  Goal: Discharged to appropriate level of care  Description  Discharged to appropriate level of care  Outcome: Ongoing     Problem: Breathing Pattern - Ineffective:  Goal: Ability to achieve and maintain a regular respiratory rate will improve  Description  Ability to achieve and maintain a regular respiratory rate will improve  Outcome: Ongoing     Problem: Mood - Altered:  Goal: Mood stable  Description  Mood stable  Outcome: Ongoing  Goal: Level of anxiety will decrease  Description  Level of anxiety will decrease  Outcome: Ongoing  Goal: Verbalizations of depressive symptoms will decrease  Description  Verbalizations of depressive symptoms will decrease  Outcome: Ongoing

## 2020-02-06 NOTE — PROGRESS NOTES
Smoking Cessation - topics covered   []  Health Risks  []  Benefits of Quitting   []  Smoking Cessation  []  Patient has no history of tobacco use per note in significant history. [x]  Patient is former smoker per note in significant history. Patient quit in 2010. [x]  No need for tobacco cessation education. []  Booklet given  []  Patient verbalizes understanding. []  Patient denies need for tobacco cessation education. []  Unable to meet with patient today. Will follow up as able.   Eze Nichole  8:49 AM

## 2020-02-06 NOTE — PLAN OF CARE
Problem: Fluid Volume - Imbalance:  Goal: Absence of imbalanced fluid volume signs and symptoms  Description  Absence of imbalanced fluid volume signs and symptoms  2/6/2020 1031 by Johnathan Duong RN  Outcome: Ongoing  2/6/2020 0537 by Lydia Og RN  Outcome: Ongoing     Problem: Pain:  Goal: Pain level will decrease  Description  Pain level will decrease  2/6/2020 1031 by Johnathan Duong RN  Outcome: Ongoing  2/6/2020 0537 by Lydia Og RN  Outcome: Ongoing     Problem: Pain:  Goal: Control of acute pain  Description  Control of acute pain  2/6/2020 1031 by Johnathan Duong RN  Outcome: Ongoing  2/6/2020 0537 by Lydia Og RN  Outcome: Ongoing     Problem: Pain:  Goal: Control of chronic pain  Description  Control of chronic pain  2/6/2020 1031 by Johnathan Duong RN  Outcome: Ongoing  2/6/2020 0537 by Lydia Og RN  Outcome: Ongoing     Problem: Tissue Perfusion - Cardiopulmonary, Altered:  Goal: Absence of angina  Description  Absence of angina  2/6/2020 1031 by Johnathan Duong RN  Outcome: Ongoing  2/6/2020 0537 by Lydia Og RN  Outcome: Ongoing     Problem: Tissue Perfusion - Cardiopulmonary, Altered:  Goal: Hemodynamic stability will improve  Description  Hemodynamic stability will improve  2/6/2020 1031 by Johnathan Duong RN  Outcome: Ongoing  2/6/2020 0537 by Lydia Og RN  Outcome: Ongoing     Problem: Mood - Altered:  Goal: Level of anxiety will decrease  Description  Level of anxiety will decrease  2/6/2020 1031 by Johnathan Duong RN  Outcome: Ongoing  2/6/2020 0537 by Lydia Og RN  Outcome: Ongoing

## 2020-02-06 NOTE — CONSULTS
Nephrology Consult Note    Reason for Consult: Continuation of peritoneal dialysis  Requesting Physician: Critical care service  Chief Complaint: Presented to hospital for home O2 evaluation andadmitted due to hypotension   history Obtained From: Patient's wife and chart review   history of Present Illness: This is a 62 y.o. male who who was recently discharged from [facility]. Patient has a long complicated medical history. 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 Subsequently he was diagnosed with renal cell carcinoma and underwent nephrectomy. His renal failure progress and he is on peritoneal dialysis under the care of Dr. Nasir Mann. He is on CCPD and use 8 L of solution every night. According to patient's wife he was recently discharged from [facility]. At the time of discharge they wrote a prescription for home oxygen however there was no formal evaluation therefore he came to ER for oxygen assessment. In ER he found to have low blood pressure and his lactic acid was elevated therefore he was admitted to ICU. Nephrology receive a call regarding continuation of CAPD. CAPD was started with with 2 L exchanges every 6 hours. Patient denies any fever. He denies any cough. He denies any overall changes in his alertness. He chronically runs low blood pressure and takes ProAmatine 3 times a day.           Past Medical History:        Diagnosis Date    Accelerated rejection of lung transplant ([address]     Acute blood loss anemia     Acute postoperative respiratory insufficiency     Bronchiolitis obliterans syndrome (HCC)     Cancer (HCC)     COPD (chronic obstructive pulmonary disease) (HCC)     Diverticulitis     Hypoalbuminemia     Hypomagnesemia     Hypophosphatemia     Immunosuppression (HCC)     Intra-abdominal abscess (HCC)     Osteoporosis     Peritonitis ([address]     Vitamin D deficiency        Past Surgical History: Minutes per session: Not on file    Stress: Not on file   Relationships    Social connections:     Talks on phone: Not on file     Gets together: Not on file     Attends Taoist service: Not on file     Active member of club or organization: Not on file     Attends meetings of clubs or organizations: Not on file     Relationship status: Not on file    Intimate partner violence:     Fear of current or ex partner: Not on file     Emotionally abused: Not on file     Physically abused: Not on file     Forced sexual activity: Not on file   Other Topics Concern    Not on file   Social History Narrative    Not on file       Family History:   History reviewed. No pertinent family history. Review of Systems:    Constitutional: No fever or increase in cough. HEENT:  No headache. Cardiac:  No chest pain or PND. Chest:   No cough or wheezing. Abdomen:  Abdominal pain or distention  Neuro:  No focal weakness, abnormal movements orseizure like activity. Skin:   No rashes, no itching. :   No hematuria, no pyuria, no dysuria, no flank pain. Extremities:  Crease and leg swelling. Objective:  CURRENT TEMPERATURE:  Temp: 97.6 °F (36.4 °C)  MAXIMUM TEMPERATURE OVER 24HRS:  Temp (24hrs), Av.6 °F (36.4 °C), Min:97.2 °F (36.2 °C), Max:97.9 °F (36.6 °C)    CURRENT RESPIRATORY RATE:  Resp: 12  CURRENT PULSE:  Pulse: 92  CURRENT BLOOD PRESSURE:  BP: (!) 71/55  24HR BLOOD PRESSURE RANGE:  Systolic (17SXB), JLUIS:48 , Min:61 , JUM:32   ; Diastolic (61FQU), EAV:78, Min:39, Max:77    24HR INTAKE/OUTPUT:      Intake/Output Summary (Last 24 hours) at 2020 0952  Last data filed at 2020 0933  Gross per 24 hour   Intake 1638.2 ml   Output 1050 ml   Net 588.2 ml     Patient Vitals for the past 96 hrs (Last 3 readings):   Weight   20 1620 99 lb 3.3 oz (45 kg)   20 0950 97 lb (44 kg)     Physical Exam:  General appearance: Patient was lying in bed. He was alert and awake.   Skin: Warm to touch  ENT:

## 2020-02-06 NOTE — CARE COORDINATION
Transition planning:  Alerted by nurse pt will need home O2 set up today. Discussed await for Palliative care to see pt as well to further discuss goals of care. [facility] care on 2/6/2020  per [facility] CM notes. Pt will need MATHIEU and HC orders. Spoke with RT in ICU informed pt will need repeat home O2 eval as the one completed in the ER does not meet criteria as pt is in an unstable condition. Pt will require re-eval in the IP setting. Update provided to RT, she will request order for home O2 eval. PT/OT to coordinate so RT can evaluate with exercise. If pt qualifies will need DME order signed by attending, face to face completed can be completed by resident and requires to be co-signed by attending. CM to follow needs. Spoke with Liban Menard at [facility] informed pt remains inpatient, anticipate d/c later today.

## 2020-02-06 NOTE — FLOWSHEET NOTE
Assessment:  Patient was with family bedside. Family seems to indicate that patient has been in constant decline with multiple health issues. Family receptive to prayer. Patient offered his hand for  to hold while  and patient prayed together. Patient seems a peace with the possibility of dying. Family coping well. Family is Buddhist.    Intervention:   provided space for patient and family to express feelings, thoughts, and concerns. Determined patient has support and coping well. Outcome:  Family thanked  for support and prayer. Receptive to spiritual care. 02/05/20 1930   Encounter Summary   Services provided to: Patient and family together   Referral/Consult From: [facility] Family members   Continue Visiting   (2.5.2020)   Complexity of Encounter Moderate   Length of Encounter 30 minutes   Spiritual Assessment Completed Yes   Routine   Type Initial   Assessment Calm; Approachable;Coping   Intervention Active listening;Explored feelings, thoughts, concerns;Explored coping resources;Prayer;Sustaining presence/ Ministry of presence; Discussed illness/injury and it's impact; Discussed belief system/Taoism practices/harinder;Discussed relationship with God;Discussed meaning/purpose   Outcome Expressed gratitude; Acceptance;Engaged in conversation;Expressed feelings/needs/concerns;Coping;Receptive     Electronically signed by Sarah Stone on 2/6/2020 at 2:20 AM

## 2020-02-06 NOTE — DISCHARGE INSTR - COC
Continuity of Care Form    Patient Name: Tanisha Edmond   :  1955  MRN:  [id number]    Admit date:  2020  Discharge date:  2020    Code Status Order: Full Code   Advance Directives:     Admitting Physician:  Shy Fernandez DO  PCP: Monique Ross MD    Discharging Nurse: Kathryn Mo RN  [address] Unit/Room#: 0126/0126-01  Discharging Unit Phone Number: [phone]    Emergency Contact:   Extended Emergency Contact Information  Primary Emergency Contact: Rosalie Castillo  Address: [address] Phone: [phone]  Mobile Phone: [phone]  Relation: Spouse    Past Surgical History:  Past Surgical History:   Procedure Laterality Date    LEG SURGERY      LUNG TRANSPLANT, DOUBLE      SMALL INTESTINE SURGERY         Immunization History: There is no immunization history on file for this patient.     Active Problems:  Patient Active Problem List   Diagnosis Code    Diverticulitis of intestine with perforation K57.80    H/O lung transplant ([address] Z94.2    Osteoporosis M81.0    Vitamin D deficiency E55.9    Accelerated rejection of lung transplant ([address] T86.810    Bronchiolitis obliterans syndrome ([address] J42    Edema R60.9    Sepsis ([address] A41.9    Sepsis ([address] A41.9    Peritonitis ([address] K65.9    Acute postoperative respiratory insufficiency J95.89    Immunosuppressed status ([address] D89.9    Acute blood loss anemia D62    Intra-abdominal abscess (HCC) K65.1    Hypoalbuminemia E88.09    Hypophosphatemia E83.39    Hypomagnesemia E83.42    Closed fracture of left distal radius S52.502A    Pneumonia due to organism J18.9    Septic shock (HCC) A41.9, R65.21    Aspiration pneumonia of right upper lobe (HCC) J69.0    Lactic acidosis E87.2    Acute respiratory failure with hypoxia (HCC) J96.01    Hypotension I95.9    Shortness of breath R06.02    Hypovolemic shock (HCC) R57.1       Isolation/Infection:   Isolation          No personal belongings (please select all that are sent with patient):  Dentures upper, Jewelry 1 yellow metal, 1 white metal bands on lf middle finger     RN SIGNATURE:  Electronically signed by Hilda Clay RN on 2/6/20 at 1:14 PM    CASE MANAGEMENT/SOCIAL WORK SECTION    Inpatient Status Date: 02/05/2020    Readmission Risk Assessment Score:  Readmission Risk              Risk of Unplanned Readmission:        21           Discharging to Facility/ Agency   · Name: [facility]  · Address:  · Phone:[phone]  · Fax:    Dialysis Facility (if applicable)   · Name:  · Address:  · Dialysis Schedule:  · Phone:  · Fax:    / signature: Electronically signed by Rk Fisher RN on 2/6/20 at 2:33 PM    PHYSICIAN SECTION    Prognosis: Fair    Condition at Discharge: Stable    Rehab Potential (if transferring to Rehab): Fair    Recommended Labs or Other Treatments After Discharge: none    Physician Certification: I certify the above information and transfer of Antonia Kyle  is necessary for the continuing treatment of the diagnosis listed and that he requires [address] for greater 30 days.      Update Admission H&P: No change in H&P    PHYSICIAN SIGNATURE:  Electronically signed by Ellen Rouse MD on 2/6/20 at 11:15 AM

## 2020-02-06 NOTE — PROGRESS NOTES
Bronchodilator Assessment  BRONCHODILATOR ASSESSMENT SCORE  Score 0 1 2 3 4 5   Breath Sounds   []  Patient Baseline [x]  No Wheeze good aeration []  Faint, scattered wheezing, good aeration []  Expiratory Wheezing and or moderately diminished []  Insp/Exp wheeze and/or very diminished []  Insp/Exp and/ or marked distress   Respiratory Rate   []  Patient Baseline [x]  Less than 20 []  Less than 20 []  20-25 []  Greater than 25 []  Greater than 25   Peak flow % of Pred or PB [x]  NA   []  Greater than 90%  []  81-90% []  71-80% []  Less than or equal to 70%  or unable to perform []  Unable due to Respiratory Distress   Dyspnea re [x]  Patient Baseline []  No SOB []  No SOB []  SOB on exertion []  SOB min activity []  At rest/acute   e FEV% Predicted       [x]  NA []  Above 69%  []  Unable []  Above 60-69%  []  Unable []  Above 50-59%  []  Unable []  Above 35-49%  []  Unable []  Less than 35%  []  Unable                 []  Hyperinflation Assessment  Score 1 2 3   CXR and Breath Sounds   []  Clear []  No atelectasis  Basilar aeration []  Atelectasis or absent basilar breath sounds   Incentive Spirometry Volume  (Per IBW)   []  Greater than or equal to 15ml/Kg []  less than 15ml/Kg []  less than 15ml/Kg   Surgery within last 2 weeks []  None or general   []  Abdominal or thoracic surgery  []  Abdominal or thoracic   Chronic Pulmonary Historyre []  No []  Yes []  Yes     []  Secretion Management Assessment  Score 1 2 3   Bilateral Breath Sounds   []  Occasional Rhonchi []  Scattered Rhonchi []  Course Rhonchi and/or poor aeration   Sputum    []  Small amount of thin secretions []  Moderate amount of viscous secretions []  Copius, Viscious Yellow/ Secretions   CXR as reported by physician []  clear  []  Unavailable []  Infiltrates and/or consolidation  []  Unavailable []  Mucus Plugging and or lobar consolidation  []  Unavailable   Cough []  Strong, productive cough []  Weak productive cough []  No cough or weak [phone]   90 444 915 314 873 322 464 965 902 36 079 883 628 558 480 504 276 528 747   90 813 719 961 051 313 580 770 205 52 618 132 029 399 745 578 353 368 906   40 041 569 265 569 505 388 865 654 57 092 230 515 391 034 731 660 988 594   70 269 284 299 314 329 344 359 374 70 353 382 410 439 468 496 525 554 583   75 261 274 289 305 319 334 348 364 75 344 372 400 429 458 487 515 544 573   80 253 266 282 296 312 327 342 356 80 335 362 390 419 448 [phone]

## 2020-02-07 NOTE — DISCHARGE SUMMARY
[address] [place] [address]     Department of Internal Medicine - Critical Care Service    INPATIENT DISCHARGE SUMMARY      PATIENT IDENTIFICATION:  NAME:  Yale Leyden   :   1955  MRN:    [id number]     Acct:    [de-identified]   Admit Date:  2020  Discharge date:  2020  4:52 PM   Attending Provider: No att. providers found                                     Active Problems:    Hypotension    Shortness of breath    Hypovolemic shock ([address]    Respiratory failure ([address]  Resolved Problems:    * No resolved hospital problems. *       REASON FOR HOSPITALIZATION:   Chief Complaint   Patient presents with    Respiratory Distress     pt w/ hx of double lung transplant in  was discharged from [facility] on Monday for pneumonia and Home O2 was supposed to be set up however wife states she has not received any follow up and pt still does not have home Cookie is a 62 y.o. with past medical history of colon resection with reversal in / at [facility] clinic, ESRD on peritoneal dialysis, COPD status post lung transplant in  and chronic immunosuppression on tacrolimus and prednisone. History of renal cell carcinoma on the left side diagnosed in , history of chronic hypotension on midodrine 5 mg 3 times daily and Florinef 0.1 mg twice daily, history of left popliteal chronic DVT and suspicion for nonocclusive thrombus within the left portal vein on duplex liver ultrasound on 2020 not on anticoagulation because of his low platelet count. Patient was recently admitted to [facility] for uremic symptoms and also shortness of breath from where he was transferred to [facility] as his pulmonology and oncology are from [facility] clinic. Patient had CT abdomen with contrast which is showing significant retroperitoneal lymphadenopathy status post biopsy which is pending.   Patient was there at [facility] for 2 weeks getting treated

## 2024-01-21 NOTE — PROGRESS NOTES
[] Dopamine     [] Vasopressin       [] Dobutamine  [] Phenylephrine         [] Epinephrine    CENTRAL LINES:     [] No   [x] Yes   (Date of Insertion: 2019)           If yes -     [] Right IJ     [] Left IJ [x] Right Femoral [] Left Femoral                   [] Right Subclavian [] Left Subclavian       MOORE'S CATHETER:   [x] No   [] Yes  (Date of Insertion:   )     URINE OUTPUT:            [] Good   [x] Low              [] Anuric      OBJECTIVE:     VITAL SIGNS:  /79   Pulse 80   Temp 94.1 °F (34.5 °C) (Oral)   Resp 12   Ht 5' 6\" (1.676 m)   Wt 118 lb 2.7 oz (53.6 kg)   SpO2 99%   BMI 19.07 kg/m²   Tmax over 24 hours:  Temp (24hrs), Av.9 °F (35.5 °C), Min:93.9 °F (34.4 °C), Max:97.4 °F (36.3 °C)      Patient Vitals for the past 8 hrs:   BP Temp Temp src Pulse Resp SpO2   19 0748 -- -- -- -- 12 99 %   19 0700 112/79 -- -- 80 9 98 %   19 0600 102/70 -- -- 88 16 97 %   19 0500 104/79 -- -- 103 10 96 %   19 0400 104/67 94.1 °F (34.5 °C) Oral 103 21 100 %   19 0344 -- -- -- -- 20 98 %   19 0300 112/77 -- -- 78 10 100 %   19 0200 116/81 -- -- 76 13 99 %   19 0100 126/77 -- -- 78 9 100 %   19 0015 -- -- -- -- 19 98 %         Intake/Output Summary (Last 24 hours) at 2019 0812  Last data filed at 2019 0400  Gross per 24 hour   Intake 8917 ml   Output 9050 ml   Net -133 ml     Date 19 0000 - 19 2359   Shift 3347-2038 8324-9723 8765-1842 24 Hour Total   INTAKE   I.V.(mL/kg) 4362(96.7)   7122(79.2)   Shift Total(mL/kg) 6935(72.5)   0463(29.2)   OUTPUT   Other(mL/kg) 1730(88.9)   9457(61.3)   Shift Total(mL/kg) 8437(02.2)   0015(81.1)   Weight (kg) 53.6 53.6 53.6 53.6     Wt Readings from Last 3 Encounters:   19 118 lb 2.7 oz (53.6 kg)   18 130 lb 1.1 oz (59 kg)   18 130 lb 1.1 oz (59 kg)     Body mass index is 19.07 kg/m².         PHYSICAL EXAM:  Constitutional: Appears well, saturating well through nonrebreather mask   EENT: PERRLA, EOMI, sclera clear, anicteric, oropharynx clear, no lesions, neck supple with midline trachea. Neck: Supple, symmetrical, trachea midline, no adenopathy, thyroid symmetric, no jvd skin normal  Respiratory: tenderness in right upper chest, clear to auscultation, no wheezes or rales and unlabored breathing.  No intercostal tenderness  Cardiovascular: regular rate and rhythm, normal S1, S2, no murmur noted and 2+ pulses throughout  Abdomen: soft, nontender, nondistended, no masses or organomegaly  Neurological: awake, alert and oriented to time place and person  Extremities:  peripheral pulses normal, no pedal edema, no clubbing or cyanosis      MEDICATIONS:  Scheduled Meds:   calcium gluconate IVPB  2 g Intravenous Once    potassium chloride  40 mEq Intravenous Once    [START ON 9/27/2019] levofloxacin  500 mg Intravenous Q48H    dianeal lo-emily 2.5%  2,500 mL Intraperitoneal Q6H    aspirin  81 mg Oral Daily    azithromycin  250 mg Oral Once per day on Mon Wed Fri    sulfamethoxazole-trimethoprim  1 tablet Oral 2 times per day on Wed Fri    tacrolimus  1.5 mg Oral BID    sodium chloride flush  10 mL Intravenous 2 times per day    famotidine (PEPCID) injection  20 mg Intravenous Daily    heparin (porcine)  5,000 Units Subcutaneous 3 times per day    meropenem  500 mg Intravenous Q24H    vancomycin (VANCOCIN) intermittent dosing (placeholder)   Other RX Placeholder    hydrocortisone sodium succinate PF  100 mg Intravenous Q8H     Continuous Infusions:   sodium chloride 25 mL/hr at 09/26/19 0000    norepinephrine Stopped (09/24/19 0114)     PRN Meds:     chlorproMAZINE 25 mg TID PRN   sodium chloride flush 10 mL PRN   ondansetron 4 mg Q6H PRN   acetaminophen 650 mg Q4H PRN   fentanNYL 25 mcg Q2H PRN   loperamide 2 mg TID PRN       SUPPORT DEVICES: [] Ventilator [] BIPAP  [x] Nasal Cannula [] Room Air  Vent Information  Skin Assessment: Clean, dry, & intact  FiO2 : 50 AMYLASE/LIPASE/AMMONIA  No results for input(s): AMYLASE, LIPASE, AMMONIA in the last 72 hours. Last 3 Blood Glucose:   Recent Labs     09/23/19  1905 09/24/19  0353 09/25/19  0411 09/26/19  0422   GLUCOSE 78 88 116* 159*        Cultures during this admission:     Blood cultures:                 [x]  drawn      [] Negative             []  Positive (Details:  )  Urine Culture:                   [x]  drawn      [] Negative             []  Positive (Details:  )  Sputum Culture:               [x] None drawn       [] Negative             []  Positive (Details:  )   Endotracheal aspirate:     [x] None drawn       [] Negative             []  Positive (Details:  )             Chest Xray (9/26/2019): Right upper lobe consolidation    ASSESSMENT:     This is a 61 y/o male patient with past medical history of ESRD secondary to left renal cell carcinoma s/p nephrectomy on peritoneal dialysis and COPD s/p bilateral lung transplant in 2011 on Azithromycin, bactrim and prednisone who was admitted with low grade fever, hypotension, tachycardia, tachypnea with labs s/o lactic acidosis and imaging s/o right upper lobe pneumonia. Due to septic shock likely secondary to aspiration pneumonia vs penumonitis, he was started on Vancomycin and Zosyn and was given 30 ml/kg I.v. Fluids. He continued to be hypotensive, right femoral central line was placed and he was started on levophed. He was transferred to ICU for further evaluation and management. He was started on hydrocortisone 100 mg q8 due to concern for adrenal insufficiency and blood pressure improved. He is being started on peritoneal dialysis and will follow up on chest CT due to concern for rib fracture/ other abnormality. PLAN:     1. Sepsis secondary to right upper lobe pneumonia and acute respiratory failure  - Was given I.v.  Fluid bolus of 30 cc/kg in the ED.  - s/p right femoral central line placement, off levophed, MAP >65 at this point.  - Continue high flow (E4) spontaneous